# Patient Record
Sex: MALE | Race: WHITE | URBAN - METROPOLITAN AREA
[De-identification: names, ages, dates, MRNs, and addresses within clinical notes are randomized per-mention and may not be internally consistent; named-entity substitution may affect disease eponyms.]

---

## 2017-02-03 ENCOUNTER — HOSPITAL ENCOUNTER (OUTPATIENT)
Dept: MRI IMAGING | Age: 47
Discharge: HOME | End: 2017-02-03

## 2017-02-03 DIAGNOSIS — M51.36 ANNULAR TEAR OF LUMBAR DISC: ICD-10-CM

## 2023-09-21 ENCOUNTER — HOSPITAL ENCOUNTER (INPATIENT)
Age: 53
LOS: 2 days | Discharge: HOME | DRG: 445 | End: 2023-09-23
Attending: EMERGENCY MEDICINE | Admitting: STUDENT IN AN ORGANIZED HEALTH CARE EDUCATION/TRAINING PROGRAM
Payer: MEDICARE

## 2023-09-21 ENCOUNTER — APPOINTMENT (OUTPATIENT)
Dept: CT IMAGING | Age: 53
DRG: 445 | End: 2023-09-21
Payer: MEDICARE

## 2023-09-21 ENCOUNTER — APPOINTMENT (OUTPATIENT)
Dept: ULTRASOUND IMAGING | Age: 53
DRG: 445 | End: 2023-09-21
Payer: MEDICARE

## 2023-09-21 DIAGNOSIS — K80.51 CALCULUS OF BILE DUCT WITHOUT CHOLANGITIS WITH OBSTRUCTION: Primary | ICD-10-CM

## 2023-09-21 PROBLEM — K80.50 CHOLEDOCHOLITHIASIS: Status: ACTIVE | Noted: 2023-09-21

## 2023-09-21 LAB
ALBUMIN SERPL-MCNC: 3.7 G/DL (ref 3.4–5)
ALBUMIN/GLOB SERPL: 1.1 {RATIO} (ref 1.1–2.2)
ALP SERPL-CCNC: 77 U/L (ref 40–129)
ALT SERPL-CCNC: 27 U/L (ref 10–40)
ANION GAP SERPL CALCULATED.3IONS-SCNC: 14 MMOL/L (ref 3–16)
APTT BLD: 33.1 SEC (ref 22.7–35.9)
AST SERPL-CCNC: 24 U/L (ref 15–37)
BACTERIA URNS QL MICRO: ABNORMAL /HPF
BASOPHILS # BLD: 0.2 K/UL (ref 0–0.2)
BASOPHILS NFR BLD: 2 %
BILIRUB SERPL-MCNC: 5 MG/DL (ref 0–1)
BILIRUB UR QL STRIP.AUTO: ABNORMAL
BUN SERPL-MCNC: 18 MG/DL (ref 7–20)
CALCIUM SERPL-MCNC: 9.4 MG/DL (ref 8.3–10.6)
CHLORIDE SERPL-SCNC: 95 MMOL/L (ref 99–110)
CLARITY UR: CLEAR
CO2 SERPL-SCNC: 25 MMOL/L (ref 21–32)
COLOR UR: ABNORMAL
CREAT SERPL-MCNC: 0.9 MG/DL (ref 0.9–1.3)
DEPRECATED RDW RBC AUTO: 12.3 % (ref 12.4–15.4)
EOSINOPHIL # BLD: 0 K/UL (ref 0–0.6)
EOSINOPHIL NFR BLD: 0 %
EPI CELLS #/AREA URNS HPF: ABNORMAL /HPF (ref 0–5)
FLUAV RNA RESP QL NAA+PROBE: NOT DETECTED
FLUBV RNA RESP QL NAA+PROBE: NOT DETECTED
GFR SERPLBLD CREATININE-BSD FMLA CKD-EPI: >60 ML/MIN/{1.73_M2}
GLUCOSE SERPL-MCNC: 148 MG/DL (ref 70–99)
GLUCOSE UR STRIP.AUTO-MCNC: 100 MG/DL
HCT VFR BLD AUTO: 45.9 % (ref 40.5–52.5)
HGB BLD-MCNC: 16.9 G/DL (ref 13.5–17.5)
HGB UR QL STRIP.AUTO: NEGATIVE
HYALINE CASTS #/AREA URNS LPF: ABNORMAL /LPF (ref 0–2)
INR PPP: 1.26 (ref 0.84–1.16)
KETONES UR STRIP.AUTO-MCNC: 15 MG/DL
LACTATE BLDV-SCNC: 1.2 MMOL/L (ref 0.4–1.9)
LACTATE BLDV-SCNC: 1.4 MMOL/L (ref 0.4–1.9)
LEUKOCYTE ESTERASE UR QL STRIP.AUTO: NEGATIVE
LIPASE SERPL-CCNC: 31 U/L (ref 13–60)
LYMPHOCYTES # BLD: 0.8 K/UL (ref 1–5.1)
LYMPHOCYTES NFR BLD: 7 %
MAGNESIUM SERPL-MCNC: 1.8 MG/DL (ref 1.8–2.4)
MCH RBC QN AUTO: 30.4 PG (ref 26–34)
MCHC RBC AUTO-ENTMCNC: 36.8 G/DL (ref 31–36)
MCV RBC AUTO: 82.6 FL (ref 80–100)
MONOCYTES # BLD: 1.5 K/UL (ref 0–1.3)
MONOCYTES NFR BLD: 13 %
NEUTROPHILS # BLD: 9.2 K/UL (ref 1.7–7.7)
NEUTROPHILS NFR BLD: 68 %
NEUTS BAND NFR BLD MANUAL: 10 % (ref 0–7)
NEUTS VAC BLD QL SMEAR: PRESENT
NITRITE UR QL STRIP.AUTO: POSITIVE
PATH INTERP BLD-IMP: YES
PH UR STRIP.AUTO: 6.5 [PH] (ref 5–8)
PLATELET # BLD AUTO: 107 K/UL (ref 135–450)
PLATELET BLD QL SMEAR: ABNORMAL
PMV BLD AUTO: 8.2 FL (ref 5–10.5)
POTASSIUM SERPL-SCNC: 2.9 MMOL/L (ref 3.5–5.1)
PROT SERPL-MCNC: 7 G/DL (ref 6.4–8.2)
PROT UR STRIP.AUTO-MCNC: NEGATIVE MG/DL
PROTHROMBIN TIME: 15.8 SEC (ref 11.5–14.8)
RBC # BLD AUTO: 5.55 M/UL (ref 4.2–5.9)
RBC #/AREA URNS HPF: ABNORMAL /HPF (ref 0–4)
SARS-COV-2 RNA RESP QL NAA+PROBE: NOT DETECTED
SLIDE REVIEW: ABNORMAL
SODIUM SERPL-SCNC: 134 MMOL/L (ref 136–145)
SP GR UR STRIP.AUTO: 1.01 (ref 1–1.03)
UA COMPLETE W REFLEX CULTURE PNL UR: ABNORMAL
UA DIPSTICK W REFLEX MICRO PNL UR: YES
URN SPEC COLLECT METH UR: ABNORMAL
UROBILINOGEN UR STRIP-ACNC: >=8 E.U./DL
WBC # BLD AUTO: 11.8 K/UL (ref 4–11)
WBC #/AREA URNS HPF: ABNORMAL /HPF (ref 0–5)

## 2023-09-21 PROCEDURE — 1200000000 HC SEMI PRIVATE

## 2023-09-21 PROCEDURE — 6360000004 HC RX CONTRAST MEDICATION: Performed by: EMERGENCY MEDICINE

## 2023-09-21 PROCEDURE — 76705 ECHO EXAM OF ABDOMEN: CPT

## 2023-09-21 PROCEDURE — 6360000002 HC RX W HCPCS: Performed by: EMERGENCY MEDICINE

## 2023-09-21 PROCEDURE — 87636 SARSCOV2 & INF A&B AMP PRB: CPT

## 2023-09-21 PROCEDURE — 96366 THER/PROPH/DIAG IV INF ADDON: CPT

## 2023-09-21 PROCEDURE — G0378 HOSPITAL OBSERVATION PER HR: HCPCS

## 2023-09-21 PROCEDURE — 2580000003 HC RX 258: Performed by: EMERGENCY MEDICINE

## 2023-09-21 PROCEDURE — 83690 ASSAY OF LIPASE: CPT

## 2023-09-21 PROCEDURE — 85610 PROTHROMBIN TIME: CPT

## 2023-09-21 PROCEDURE — 2580000003 HC RX 258: Performed by: STUDENT IN AN ORGANIZED HEALTH CARE EDUCATION/TRAINING PROGRAM

## 2023-09-21 PROCEDURE — 6360000002 HC RX W HCPCS: Performed by: STUDENT IN AN ORGANIZED HEALTH CARE EDUCATION/TRAINING PROGRAM

## 2023-09-21 PROCEDURE — 80053 COMPREHEN METABOLIC PANEL: CPT

## 2023-09-21 PROCEDURE — 85025 COMPLETE CBC W/AUTO DIFF WBC: CPT

## 2023-09-21 PROCEDURE — 82746 ASSAY OF FOLIC ACID SERUM: CPT

## 2023-09-21 PROCEDURE — 83605 ASSAY OF LACTIC ACID: CPT

## 2023-09-21 PROCEDURE — 36415 COLL VENOUS BLD VENIPUNCTURE: CPT

## 2023-09-21 PROCEDURE — 96365 THER/PROPH/DIAG IV INF INIT: CPT

## 2023-09-21 PROCEDURE — C9113 INJ PANTOPRAZOLE SODIUM, VIA: HCPCS | Performed by: STUDENT IN AN ORGANIZED HEALTH CARE EDUCATION/TRAINING PROGRAM

## 2023-09-21 PROCEDURE — 82607 VITAMIN B-12: CPT

## 2023-09-21 PROCEDURE — 85730 THROMBOPLASTIN TIME PARTIAL: CPT

## 2023-09-21 PROCEDURE — 87040 BLOOD CULTURE FOR BACTERIA: CPT

## 2023-09-21 PROCEDURE — 81001 URINALYSIS AUTO W/SCOPE: CPT

## 2023-09-21 PROCEDURE — 99285 EMERGENCY DEPT VISIT HI MDM: CPT

## 2023-09-21 PROCEDURE — 96375 TX/PRO/DX INJ NEW DRUG ADDON: CPT

## 2023-09-21 PROCEDURE — 74177 CT ABD & PELVIS W/CONTRAST: CPT

## 2023-09-21 PROCEDURE — 83735 ASSAY OF MAGNESIUM: CPT

## 2023-09-21 PROCEDURE — 96361 HYDRATE IV INFUSION ADD-ON: CPT

## 2023-09-21 RX ORDER — METRONIDAZOLE 500 MG/100ML
500 INJECTION, SOLUTION INTRAVENOUS EVERY 8 HOURS
Status: DISCONTINUED | OUTPATIENT
Start: 2023-09-22 | End: 2023-09-23 | Stop reason: HOSPADM

## 2023-09-21 RX ORDER — CIPROFLOXACIN 2 MG/ML
400 INJECTION, SOLUTION INTRAVENOUS ONCE
Status: COMPLETED | OUTPATIENT
Start: 2023-09-21 | End: 2023-09-21

## 2023-09-21 RX ORDER — METRONIDAZOLE 500 MG/100ML
500 INJECTION, SOLUTION INTRAVENOUS ONCE
Status: COMPLETED | OUTPATIENT
Start: 2023-09-21 | End: 2023-09-21

## 2023-09-21 RX ORDER — SPIRONOLACTONE 25 MG/1
25 TABLET ORAL DAILY
COMMUNITY

## 2023-09-21 RX ORDER — ONDANSETRON 2 MG/ML
4 INJECTION INTRAMUSCULAR; INTRAVENOUS ONCE
Status: COMPLETED | OUTPATIENT
Start: 2023-09-21 | End: 2023-09-21

## 2023-09-21 RX ORDER — POTASSIUM CHLORIDE 7.45 MG/ML
10 INJECTION INTRAVENOUS ONCE
Status: COMPLETED | OUTPATIENT
Start: 2023-09-21 | End: 2023-09-21

## 2023-09-21 RX ORDER — POLYETHYLENE GLYCOL 3350 17 G/17G
17 POWDER, FOR SOLUTION ORAL DAILY PRN
Status: DISCONTINUED | OUTPATIENT
Start: 2023-09-21 | End: 2023-09-23 | Stop reason: HOSPADM

## 2023-09-21 RX ORDER — GABAPENTIN 300 MG/1
300 CAPSULE ORAL 2 TIMES DAILY
COMMUNITY

## 2023-09-21 RX ORDER — ENOXAPARIN SODIUM 100 MG/ML
40 INJECTION SUBCUTANEOUS DAILY
Status: DISCONTINUED | OUTPATIENT
Start: 2023-09-22 | End: 2023-09-23 | Stop reason: HOSPADM

## 2023-09-21 RX ORDER — LACTULOSE 10 G/15ML
30 SOLUTION ORAL DAILY
COMMUNITY

## 2023-09-21 RX ORDER — ONDANSETRON 2 MG/ML
4 INJECTION INTRAMUSCULAR; INTRAVENOUS EVERY 6 HOURS PRN
Status: DISCONTINUED | OUTPATIENT
Start: 2023-09-21 | End: 2023-09-23 | Stop reason: HOSPADM

## 2023-09-21 RX ORDER — SODIUM CHLORIDE 9 MG/ML
INJECTION, SOLUTION INTRAVENOUS PRN
Status: DISCONTINUED | OUTPATIENT
Start: 2023-09-21 | End: 2023-09-23 | Stop reason: HOSPADM

## 2023-09-21 RX ORDER — ACETAMINOPHEN 650 MG/1
650 SUPPOSITORY RECTAL EVERY 6 HOURS PRN
Status: DISCONTINUED | OUTPATIENT
Start: 2023-09-21 | End: 2023-09-23 | Stop reason: HOSPADM

## 2023-09-21 RX ORDER — SODIUM CHLORIDE 0.9 % (FLUSH) 0.9 %
5-40 SYRINGE (ML) INJECTION PRN
Status: DISCONTINUED | OUTPATIENT
Start: 2023-09-21 | End: 2023-09-23 | Stop reason: HOSPADM

## 2023-09-21 RX ORDER — ONDANSETRON 4 MG/1
4 TABLET, ORALLY DISINTEGRATING ORAL EVERY 8 HOURS PRN
Status: DISCONTINUED | OUTPATIENT
Start: 2023-09-21 | End: 2023-09-23 | Stop reason: HOSPADM

## 2023-09-21 RX ORDER — ACETAMINOPHEN 325 MG/1
650 TABLET ORAL EVERY 6 HOURS PRN
Status: DISCONTINUED | OUTPATIENT
Start: 2023-09-21 | End: 2023-09-23 | Stop reason: HOSPADM

## 2023-09-21 RX ORDER — 0.9 % SODIUM CHLORIDE 0.9 %
1000 INTRAVENOUS SOLUTION INTRAVENOUS ONCE
Status: COMPLETED | OUTPATIENT
Start: 2023-09-21 | End: 2023-09-21

## 2023-09-21 RX ORDER — OMEPRAZOLE 20 MG/1
20 CAPSULE, DELAYED RELEASE ORAL DAILY
COMMUNITY

## 2023-09-21 RX ORDER — PANTOPRAZOLE SODIUM 40 MG/10ML
40 INJECTION, POWDER, LYOPHILIZED, FOR SOLUTION INTRAVENOUS DAILY
Status: DISCONTINUED | OUTPATIENT
Start: 2023-09-21 | End: 2023-09-23 | Stop reason: HOSPADM

## 2023-09-21 RX ORDER — SODIUM CHLORIDE 0.9 % (FLUSH) 0.9 %
5-40 SYRINGE (ML) INJECTION EVERY 12 HOURS SCHEDULED
Status: DISCONTINUED | OUTPATIENT
Start: 2023-09-21 | End: 2023-09-23 | Stop reason: HOSPADM

## 2023-09-21 RX ORDER — SODIUM CHLORIDE, SODIUM LACTATE, POTASSIUM CHLORIDE, CALCIUM CHLORIDE 600; 310; 30; 20 MG/100ML; MG/100ML; MG/100ML; MG/100ML
INJECTION, SOLUTION INTRAVENOUS CONTINUOUS
Status: DISCONTINUED | OUTPATIENT
Start: 2023-09-21 | End: 2023-09-23 | Stop reason: HOSPADM

## 2023-09-21 RX ORDER — PROPRANOLOL HYDROCHLORIDE 20 MG/1
20 TABLET ORAL 2 TIMES DAILY
COMMUNITY

## 2023-09-21 RX ADMIN — ONDANSETRON 4 MG: 2 INJECTION INTRAMUSCULAR; INTRAVENOUS at 13:54

## 2023-09-21 RX ADMIN — PANTOPRAZOLE SODIUM 40 MG: 40 INJECTION, POWDER, FOR SOLUTION INTRAVENOUS at 20:41

## 2023-09-21 RX ADMIN — METRONIDAZOLE 500 MG: 500 INJECTION, SOLUTION INTRAVENOUS at 16:09

## 2023-09-21 RX ADMIN — SODIUM CHLORIDE 1000 ML: 9 INJECTION, SOLUTION INTRAVENOUS at 13:54

## 2023-09-21 RX ADMIN — METRONIDAZOLE 500 MG: 500 INJECTION, SOLUTION INTRAVENOUS at 21:16

## 2023-09-21 RX ADMIN — POTASSIUM CHLORIDE 10 MEQ: 7.46 INJECTION, SOLUTION INTRAVENOUS at 14:33

## 2023-09-21 RX ADMIN — IOPAMIDOL 75 ML: 755 INJECTION, SOLUTION INTRAVENOUS at 14:49

## 2023-09-21 RX ADMIN — SODIUM CHLORIDE, POTASSIUM CHLORIDE, SODIUM LACTATE AND CALCIUM CHLORIDE: 600; 310; 30; 20 INJECTION, SOLUTION INTRAVENOUS at 23:19

## 2023-09-21 RX ADMIN — CEFTRIAXONE SODIUM 2000 MG: 2 INJECTION, POWDER, FOR SOLUTION INTRAMUSCULAR; INTRAVENOUS at 20:41

## 2023-09-21 RX ADMIN — CIPROFLOXACIN 400 MG: 400 INJECTION, SOLUTION INTRAVENOUS at 17:41

## 2023-09-21 ASSESSMENT — ENCOUNTER SYMPTOMS
ABDOMINAL PAIN: 1
CHEST TIGHTNESS: 0
COLOR CHANGE: 1
COUGH: 1
SORE THROAT: 0
VOMITING: 0
NAUSEA: 1
SHORTNESS OF BREATH: 0
EYES NEGATIVE: 1
DIARRHEA: 1

## 2023-09-21 ASSESSMENT — PAIN - FUNCTIONAL ASSESSMENT: PAIN_FUNCTIONAL_ASSESSMENT: 0-10

## 2023-09-21 ASSESSMENT — PAIN SCALES - GENERAL: PAINLEVEL_OUTOF10: 2

## 2023-09-21 NOTE — ED NOTES
@2202 called general surgery per Dr. Alex Delong  RE:Cholelithiasis with obstruction  @9051 second page  @6677 Dr. Ermelinda Jamison called [facility] and spoke to Dr. Thu Hartmann  09/21/23 [phone]

## 2023-09-21 NOTE — ED NOTES
@4866 perfectserved Dr. Dayana Nixon per Dr. Natalya Cabrales also called [facility]   RE: Cholelithiasis  Dr. Vincent Reed called back, Dr. Natalya Cabrales was in a sterile procedure.  Dr. Vincent Reed left personal phone number  Emiliano Beth called Dr. Dennis Gardner  09/21/23 [phone]

## 2023-09-21 NOTE — ED PROVIDER NOTES
[address] [place] [address]  ED  EMERGENCY DEPARTMENT ENCOUNTER        Pt Name: Kam Lopez  MRN: [id number]  [facility] 1970  Date of evaluation: 9/21/2023  Provider: Rebecca Neves MD  PCP: Ramya Hdz MD  Note Started: 2:15 PM EDT 9/21/23    CHIEF COMPLAINT       Chief Complaint   Patient presents with    Illness     Pt reports abd cramps, \"shakes\", \"sweats\", muscle pain, nausea and diarrhea since Sunday. Pt self dx with the flu but sts that he has hx of cirrhosis and feels as if his skin is yellowing and is concerned with that. Pt reports dark urine as well. HISTORY OF PRESENT ILLNESS: 1 or more Elements     History from : Patient    Limitations to history : None    Kam Lopez is a 47 y.o. male who presents with generalized illness, abdominal cramps, sweats, chills, shakes, myalgias, nausea and diarrhea. Patient states he felt like he had the flu last week improved from that but now he feels like his skin is yellow. He is also had very dark urine. Having intermittent right upper quadrant abdominal pain. Patient has a history of alcohol induced cirrhosis but states he has not drank for greater than 10 years. Nursing Notes were all reviewed and agreed with or any disagreements were addressed in the HPI. REVIEW OF SYSTEMS :      Review of Systems   Constitutional:  Positive for chills, fatigue and fever. HENT:  Positive for congestion. Negative for sore throat. Eyes: Negative. Respiratory:  Positive for cough. Negative for chest tightness and shortness of breath. Cardiovascular:  Negative for chest pain. Gastrointestinal:  Positive for abdominal pain, diarrhea and nausea. Negative for vomiting. Genitourinary: Negative. Musculoskeletal:  Positive for myalgias. Skin:  Positive for color change. Neurological:  Negative for dizziness, light-headedness and headaches. All other systems reviewed and are negative.       Positives and Pertinent negatives as per

## 2023-09-21 NOTE — ED NOTES
Mr. Luz Calderón is a 47 y.o. male who had concerns including Illness (Pt reports abd cramps, \"shakes\", \"sweats\", muscle pain, nausea and diarrhea since Sunday. Pt self dx with the flu but sts that he has hx of cirrhosis and feels as if his skin is yellowing and is concerned with that. Pt reports dark urine as well. ). Chief Complaint   Patient presents with    Illness     Pt reports abd cramps, \"shakes\", \"sweats\", muscle pain, nausea and diarrhea since Sunday. Pt self dx with the flu but sts that he has hx of cirrhosis and feels as if his skin is yellowing and is concerned with that. Pt reports dark urine as well. He is being admitted for:    Choledocholithiasis    His ED problem list included:    1. Calculus of bile duct without cholangitis with obstruction        Past Medical History:   Diagnosis Date    Chronic back pain     Liver cirrhosis ([address])        History reviewed. No pertinent surgical history.     His recent abnormal labs were:    Labs Reviewed   CBC WITH AUTO DIFFERENTIAL - Abnormal; Notable for the following components:       Result Value    WBC 11.8 (*)     MCHC 36.8 (*)     RDW 12.3 (*)     Platelets 391 (*)     Neutrophils Absolute 9.2 (*)     Lymphocytes Absolute 0.8 (*)     Monocytes Absolute 1.5 (*)     Bands Relative 10 (*)     Vacuolated Neutrophils Present (*)     All other components within normal limits   COMPREHENSIVE METABOLIC PANEL W/ REFLEX TO MG FOR LOW K - Abnormal; Notable for the following components:    Sodium 134 (*)     Potassium reflex Magnesium 2.9 (*)     Chloride 95 (*)     Glucose 148 (*)     Total Bilirubin 5.0 (*)     All other components within normal limits    Narrative:     CALL  Arthur  [facility] tel. [phone],  Chemistry results called to and read back by Guido Underwood RN, 09/21/2023  14:23, by [address] - Abnormal; Notable for the following components:    Protime 15.8 (*)     INR 1.26 (*)     All other components within normal limits   COVID-19 & INFLUENZA elevated bilirubin, hx of cirrhosis TECHNOLOGIST PROVIDED HISTORY: Reason for exam:->Jaundice, RUQ, elevated bilirubin, hx of cirrhosis FINDINGS: LIVER:  There is a lobular contour to the liver. No biliary ductal dilatation or mass BILIARY SYSTEM:  Stones are seen in the gallbladder. No pericholecystic fluid. No sonographic Wilkins sign Common bile duct is within normal limits measuring 5 mm. RIGHT KIDNEY: The right kidney is grossly unremarkable without evidence of hydronephrosis. PANCREAS:  Visualized portions of the pancreas are unremarkable. OTHER: No evidence of right upper quadrant ascites. Cholelithiasis. No cholecystitis Lobular contour to the liver, compatible with cirrhosis. No biliary ductal dilatation or mass     CT ABDOMEN PELVIS W IV CONTRAST Additional Contrast? None    Result Date: 9/21/2023  EXAMINATION: CT OF THE ABDOMEN AND PELVIS WITH CONTRAST 9/21/2023 2:39 pm TECHNIQUE: CT of the abdomen and pelvis was performed with the administration of intravenous contrast. Multiplanar reformatted images are provided for review. Automated exposure control, iterative reconstruction, and/or weight based adjustment of the mA/kV was utilized to reduce the radiation dose to as low as reasonably achievable. COMPARISON: None. HISTORY: ORDERING SYSTEM PROVIDED HISTORY: RUQ pain, jaundice, hx of cirrhosis TECHNOLOGIST PROVIDED HISTORY: Additional Contrast?->None Reason for exam:->RUQ pain, jaundice, hx of cirrhosis Decision Support Exception - unselect if not a suspected or confirmed emergency medical condition->Emergency Medical Condition (MA) Reason for Exam: RUQ pain since sunday-nausea-chills-diarrhea Additional signs and symptoms: hx-cirrhosis Relevant Medical/Surgical History: no surg FINDINGS: Lower Chest: No acute infiltrate or pleural effusion. Mild right basilar atelectasis. Organs: There are numerous gallstones within the gallbladder lumen. The gallbladder wall is thickened up to 4 mm.   No

## 2023-09-21 NOTE — H&P
V2.0  History and Physical      Name:  Constantin Katz /Age/Sex: 1970  (47 y.o. male)   MRN & CSN:  [id number] & [id number] Encounter Date/Time: 2023 4:55 PM EDT   Location:   PCP: Octavio Gunter MD       Hospital Day: 1    Assessment and Plan:   Constantin Katz is a 47 y.o. male with a pmh of alcoholic cirrhosis who presents with Choledocholithiasis    Hospital Problems             Last Modified POA    * (Principal) Choledocholithiasis 2023 Yes       Right upper quadrant pain in the setting of choledocholithiasis with obstruction: GI has been consulted. General surgery Case was discussed with them. Plan is ERCP likely in the morning. N.p.o. at midnight. Start the patient on ceftriaxone and Flagyl. IV fluid hydration. Continue to monitor. We will start the patient on ceftriaxone and Flagyl. Generalized weakness and debility in the setting of above  Underlying history of of alcoholic cirrhosis complicated with esophageal varices with history of banding: poorly compliant with medications. CT scan of the abdominal pelvis shows concerns for cholecystitis? Piper Nichols Ultrasound of the right upper quadrant shows choledocholithiasis with gallstones. GI to be consulted. Trend bilirubin levels. Consider starting lactulose and rifaximin  Hypokalemia replete as needed    Discussed with ED doctor regarding need for GI and general surgery to discuss the case for consideration for ERCP versus cholecystectomy. Comment: Please note this report has been produced using speech recognition software and may contain errors related to that system including errors in grammar, punctuation, and spelling, as well as words and phrases that may be inappropriate. If there are any questions or concerns please feel free to contact the dictating provider for clarification.      Disposition:   Current Living situation: Home  Expected Disposition: Home  Estimated D/C: 2 to 3 days    Diet No diet orders on file   DVT reformatted images are provided for review. Automated exposure control, iterative reconstruction, and/or weight based adjustment of the mA/kV was utilized to reduce the radiation dose to as low as reasonably achievable. COMPARISON: None. HISTORY: ORDERING SYSTEM PROVIDED HISTORY: RUQ pain, jaundice, hx of cirrhosis TECHNOLOGIST PROVIDED HISTORY: Additional Contrast?->None Reason for exam:->RUQ pain, jaundice, hx of cirrhosis Decision Support Exception - unselect if not a suspected or confirmed emergency medical condition->Emergency Medical Condition (MA) Reason for Exam: RUQ pain since sunday-nausea-chills-diarrhea Additional signs and symptoms: hx-cirrhosis Relevant Medical/Surgical History: no surg FINDINGS: Lower Chest: No acute infiltrate or pleural effusion. Mild right basilar atelectasis. Organs: There are numerous gallstones within the gallbladder lumen. The gallbladder wall is thickened up to 4 mm. No significant surrounding pericholecystic inflammatory change. No free fluid around the gallbladder. There are 3 stones within the distal common bile duct near the ampulla measuring up to 3 mm. The common bile duct is at the upper limits of normal measuring up to 6 mm. Diffusely nodular surface contour of the liver consistent with cirrhosis. No focal liver lesions or intrahepatic biliary ductal dilatation. The portal vein is patent. There is recanalization of the umbilical vein. The spleen and pancreas are normal.  The adrenal glands are normal.  3 mm nonobstructing left renal stone. The right kidney is unremarkable GI/Bowel: The distal esophagus and stomach are normal.  Small bowel loops show no evidence of obstruction or inflammation. The colon shows no evidence of wall thickening or inflammatory change. Normal appendix. Pelvis: The urinary bladder and prostate are normal.  No free fluid. Peritoneum/Retroperitoneum: The aorta is normal caliber. No lymphadenopathy.  Bones/Soft Tissues: No suspicious osseous lesion. 1.  Numerous gallstones within the gallbladder lumen. The gallbladder wall is thickened up to 4 mm. This may represent acute cholecystitis. Recommend further evaluation with right upper quadrant ultrasound. 2.  3 small stones are seen within the distal common bile duct near the ampulla measuring up to 3 mm. The common bile duct is at the upper limits of normal measuring 6 mm. This may represent developing common bile duct obstruction. 3.  Findings of hepatic cirrhosis with sequela of portal hypertension.        Personally reviewed Lab Studies, Imaging    Electronically signed by Best Zhagn MD on 9/21/2023 at 5:02 PM

## 2023-09-22 ENCOUNTER — ANESTHESIA EVENT (OUTPATIENT)
Dept: ENDOSCOPY | Age: 53
End: 2023-09-22
Payer: MEDICARE

## 2023-09-22 ENCOUNTER — APPOINTMENT (OUTPATIENT)
Dept: GENERAL RADIOLOGY | Age: 53
DRG: 445 | End: 2023-09-22
Payer: MEDICARE

## 2023-09-22 ENCOUNTER — ANESTHESIA (OUTPATIENT)
Dept: ENDOSCOPY | Age: 53
End: 2023-09-22
Payer: MEDICARE

## 2023-09-22 PROBLEM — K80.51 CALCULUS OF BILE DUCT WITHOUT CHOLANGITIS WITH OBSTRUCTION: Status: ACTIVE | Noted: 2023-09-22

## 2023-09-22 LAB
ALBUMIN SERPL-MCNC: 2.7 G/DL (ref 3.4–5)
ALP SERPL-CCNC: 54 U/L (ref 40–129)
ALT SERPL-CCNC: 18 U/L (ref 10–40)
ANION GAP SERPL CALCULATED.3IONS-SCNC: 8 MMOL/L (ref 3–16)
AST SERPL-CCNC: 16 U/L (ref 15–37)
BACTERIA BLD CULT ORG #2: NORMAL
BACTERIA BLD CULT: NORMAL
BACTERIA URNS QL MICRO: ABNORMAL /HPF
BILIRUB DIRECT SERPL-MCNC: 1.3 MG/DL (ref 0–0.3)
BILIRUB INDIRECT SERPL-MCNC: 1.4 MG/DL (ref 0–1)
BILIRUB SERPL-MCNC: 2.7 MG/DL (ref 0–1)
BILIRUB UR QL STRIP.AUTO: ABNORMAL
BUN SERPL-MCNC: 14 MG/DL (ref 7–20)
CALCIUM SERPL-MCNC: 8.2 MG/DL (ref 8.3–10.6)
CHLORIDE SERPL-SCNC: 104 MMOL/L (ref 99–110)
CLARITY UR: CLEAR
CO2 SERPL-SCNC: 25 MMOL/L (ref 21–32)
COLOR UR: ABNORMAL
CREAT SERPL-MCNC: 0.7 MG/DL (ref 0.9–1.3)
DEPRECATED RDW RBC AUTO: 12.5 % (ref 12.4–15.4)
EPI CELLS #/AREA URNS HPF: ABNORMAL /HPF (ref 0–5)
FOLATE SERPL-MCNC: 8.31 NG/ML (ref 4.78–24.2)
GFR SERPLBLD CREATININE-BSD FMLA CKD-EPI: >60 ML/MIN/{1.73_M2}
GLUCOSE SERPL-MCNC: 103 MG/DL (ref 70–99)
GLUCOSE UR STRIP.AUTO-MCNC: 100 MG/DL
HCT VFR BLD AUTO: 38 % (ref 40.5–52.5)
HGB BLD-MCNC: 14 G/DL (ref 13.5–17.5)
HGB UR QL STRIP.AUTO: NEGATIVE
INR PPP: 1.25 (ref 0.84–1.16)
KETONES UR STRIP.AUTO-MCNC: 40 MG/DL
LEUKOCYTE ESTERASE UR QL STRIP.AUTO: ABNORMAL
MAGNESIUM SERPL-MCNC: 1.9 MG/DL (ref 1.8–2.4)
MCH RBC QN AUTO: 30.5 PG (ref 26–34)
MCHC RBC AUTO-ENTMCNC: 36.9 G/DL (ref 31–36)
MCV RBC AUTO: 82.5 FL (ref 80–100)
MUCOUS THREADS #/AREA URNS LPF: ABNORMAL /LPF
NITRITE UR QL STRIP.AUTO: NEGATIVE
PATH INTERP BLD-IMP: NORMAL
PH UR STRIP.AUTO: 7.5 [PH] (ref 5–8)
PHOSPHATE SERPL-MCNC: 2.1 MG/DL (ref 2.5–4.9)
PLATELET # BLD AUTO: 73 K/UL (ref 135–450)
PMV BLD AUTO: 8 FL (ref 5–10.5)
POTASSIUM SERPL-SCNC: 3 MMOL/L (ref 3.5–5.1)
POTASSIUM SERPL-SCNC: 4 MMOL/L (ref 3.5–5.1)
PROT SERPL-MCNC: 5.5 G/DL (ref 6.4–8.2)
PROT UR STRIP.AUTO-MCNC: ABNORMAL MG/DL
PROTHROMBIN TIME: 15.7 SEC (ref 11.5–14.8)
RBC # BLD AUTO: 4.61 M/UL (ref 4.2–5.9)
RBC #/AREA URNS HPF: ABNORMAL /HPF (ref 0–4)
SODIUM SERPL-SCNC: 137 MMOL/L (ref 136–145)
SP GR UR STRIP.AUTO: 1.01 (ref 1–1.03)
UA DIPSTICK W REFLEX MICRO PNL UR: YES
URN SPEC COLLECT METH UR: ABNORMAL
UROBILINOGEN UR STRIP-ACNC: >=8 E.U./DL
VIT B12 SERPL-MCNC: 566 PG/ML (ref 211–911)
WBC # BLD AUTO: 5.7 K/UL (ref 4–11)
WBC #/AREA URNS HPF: ABNORMAL /HPF (ref 0–5)

## 2023-09-22 PROCEDURE — 2580000003 HC RX 258: Performed by: INTERNAL MEDICINE

## 2023-09-22 PROCEDURE — 3700000001 HC ADD 15 MINUTES (ANESTHESIA): Performed by: INTERNAL MEDICINE

## 2023-09-22 PROCEDURE — 6360000002 HC RX W HCPCS: Performed by: INTERNAL MEDICINE

## 2023-09-22 PROCEDURE — 36415 COLL VENOUS BLD VENIPUNCTURE: CPT

## 2023-09-22 PROCEDURE — 97535 SELF CARE MNGMENT TRAINING: CPT

## 2023-09-22 PROCEDURE — C2625 STENT, NON-COR, TEM W/DEL SY: HCPCS | Performed by: INTERNAL MEDICINE

## 2023-09-22 PROCEDURE — 83735 ASSAY OF MAGNESIUM: CPT

## 2023-09-22 PROCEDURE — 97161 PT EVAL LOW COMPLEX 20 MIN: CPT

## 2023-09-22 PROCEDURE — 2580000003 HC RX 258: Performed by: STUDENT IN AN ORGANIZED HEALTH CARE EDUCATION/TRAINING PROGRAM

## 2023-09-22 PROCEDURE — 6360000002 HC RX W HCPCS: Performed by: STUDENT IN AN ORGANIZED HEALTH CARE EDUCATION/TRAINING PROGRAM

## 2023-09-22 PROCEDURE — 6370000000 HC RX 637 (ALT 250 FOR IP): Performed by: INTERNAL MEDICINE

## 2023-09-22 PROCEDURE — 3609015200 HC ERCP REMOVE CALCULI/DEBRIS BILIARY/PANCREAS DUCT: Performed by: INTERNAL MEDICINE

## 2023-09-22 PROCEDURE — 3609018800 HC ERCP DX COLLECTION SPECIMEN BRUSHING/WASHING: Performed by: INTERNAL MEDICINE

## 2023-09-22 PROCEDURE — 84132 ASSAY OF SERUM POTASSIUM: CPT

## 2023-09-22 PROCEDURE — C9113 INJ PANTOPRAZOLE SODIUM, VIA: HCPCS | Performed by: STUDENT IN AN ORGANIZED HEALTH CARE EDUCATION/TRAINING PROGRAM

## 2023-09-22 PROCEDURE — 97116 GAIT TRAINING THERAPY: CPT

## 2023-09-22 PROCEDURE — 1200000000 HC SEMI PRIVATE

## 2023-09-22 PROCEDURE — 0F798DZ DILATION OF COMMON BILE DUCT WITH INTRALUMINAL DEVICE, VIA NATURAL OR ARTIFICIAL OPENING ENDOSCOPIC: ICD-10-PCS | Performed by: INTERNAL MEDICINE

## 2023-09-22 PROCEDURE — 99222 1ST HOSP IP/OBS MODERATE 55: CPT | Performed by: SURGERY

## 2023-09-22 PROCEDURE — 6370000000 HC RX 637 (ALT 250 FOR IP): Performed by: REGISTERED NURSE

## 2023-09-22 PROCEDURE — 3609014900 HC ERCP W/SPHINCTEROTOMY &/OR PAPILLOTOMY: Performed by: INTERNAL MEDICINE

## 2023-09-22 PROCEDURE — 2720000010 HC SURG SUPPLY STERILE: Performed by: INTERNAL MEDICINE

## 2023-09-22 PROCEDURE — 3609015100 HC ERCP STENT PLACEMENT BILIARY/PANCREATIC DUCT: Performed by: INTERNAL MEDICINE

## 2023-09-22 PROCEDURE — 80048 BASIC METABOLIC PNL TOTAL CA: CPT

## 2023-09-22 PROCEDURE — 85027 COMPLETE CBC AUTOMATED: CPT

## 2023-09-22 PROCEDURE — 74328 X-RAY BILE DUCT ENDOSCOPY: CPT

## 2023-09-22 PROCEDURE — G0378 HOSPITAL OBSERVATION PER HR: HCPCS

## 2023-09-22 PROCEDURE — 6360000002 HC RX W HCPCS: Performed by: NURSE ANESTHETIST, CERTIFIED REGISTERED

## 2023-09-22 PROCEDURE — 0FC98ZZ EXTIRPATION OF MATTER FROM COMMON BILE DUCT, VIA NATURAL OR ARTIFICIAL OPENING ENDOSCOPIC: ICD-10-PCS | Performed by: INTERNAL MEDICINE

## 2023-09-22 PROCEDURE — 84100 ASSAY OF PHOSPHORUS: CPT

## 2023-09-22 PROCEDURE — 80076 HEPATIC FUNCTION PANEL: CPT

## 2023-09-22 PROCEDURE — 3700000000 HC ANESTHESIA ATTENDED CARE: Performed by: INTERNAL MEDICINE

## 2023-09-22 PROCEDURE — 2500000003 HC RX 250 WO HCPCS: Performed by: STUDENT IN AN ORGANIZED HEALTH CARE EDUCATION/TRAINING PROGRAM

## 2023-09-22 PROCEDURE — 85610 PROTHROMBIN TIME: CPT

## 2023-09-22 PROCEDURE — 81001 URINALYSIS AUTO W/SCOPE: CPT

## 2023-09-22 PROCEDURE — 2709999900 HC NON-CHARGEABLE SUPPLY: Performed by: INTERNAL MEDICINE

## 2023-09-22 PROCEDURE — 7100000000 HC PACU RECOVERY - FIRST 15 MIN: Performed by: INTERNAL MEDICINE

## 2023-09-22 PROCEDURE — 2500000003 HC RX 250 WO HCPCS: Performed by: NURSE ANESTHETIST, CERTIFIED REGISTERED

## 2023-09-22 PROCEDURE — 3609017100 HC EGD: Performed by: INTERNAL MEDICINE

## 2023-09-22 PROCEDURE — 2580000003 HC RX 258: Performed by: NURSE ANESTHETIST, CERTIFIED REGISTERED

## 2023-09-22 PROCEDURE — 7100000001 HC PACU RECOVERY - ADDTL 15 MIN: Performed by: INTERNAL MEDICINE

## 2023-09-22 PROCEDURE — 6370000000 HC RX 637 (ALT 250 FOR IP): Performed by: NURSE PRACTITIONER

## 2023-09-22 PROCEDURE — 97165 OT EVAL LOW COMPLEX 30 MIN: CPT

## 2023-09-22 DEVICE — BILIARY STENT WITH NAVIFLEXTM RX DELIVERY SYSTEM
Type: IMPLANTABLE DEVICE | Site: BILE DUCT | Status: FUNCTIONAL
Brand: ADVANIX™ BILIARY

## 2023-09-22 RX ORDER — ROCURONIUM BROMIDE 10 MG/ML
INJECTION, SOLUTION INTRAVENOUS PRN
Status: DISCONTINUED | OUTPATIENT
Start: 2023-09-22 | End: 2023-09-22 | Stop reason: SDUPTHER

## 2023-09-22 RX ORDER — ONDANSETRON 2 MG/ML
INJECTION INTRAMUSCULAR; INTRAVENOUS PRN
Status: DISCONTINUED | OUTPATIENT
Start: 2023-09-22 | End: 2023-09-22 | Stop reason: SDUPTHER

## 2023-09-22 RX ORDER — SODIUM CHLORIDE, SODIUM LACTATE, POTASSIUM CHLORIDE, CALCIUM CHLORIDE 600; 310; 30; 20 MG/100ML; MG/100ML; MG/100ML; MG/100ML
INJECTION, SOLUTION INTRAVENOUS CONTINUOUS PRN
Status: DISCONTINUED | OUTPATIENT
Start: 2023-09-22 | End: 2023-09-22 | Stop reason: SDUPTHER

## 2023-09-22 RX ORDER — OXYCODONE HYDROCHLORIDE 5 MG/1
10 TABLET ORAL PRN
Status: DISCONTINUED | OUTPATIENT
Start: 2023-09-22 | End: 2023-09-22

## 2023-09-22 RX ORDER — MECOBALAMIN 5000 MCG
10 TABLET,DISINTEGRATING ORAL NIGHTLY
Status: DISCONTINUED | OUTPATIENT
Start: 2023-09-22 | End: 2023-09-23 | Stop reason: HOSPADM

## 2023-09-22 RX ORDER — GABAPENTIN 300 MG/1
300 CAPSULE ORAL DAILY
Status: DISCONTINUED | OUTPATIENT
Start: 2023-09-22 | End: 2023-09-23 | Stop reason: HOSPADM

## 2023-09-22 RX ORDER — LABETALOL HYDROCHLORIDE 5 MG/ML
INJECTION, SOLUTION INTRAVENOUS PRN
Status: DISCONTINUED | OUTPATIENT
Start: 2023-09-22 | End: 2023-09-22 | Stop reason: SDUPTHER

## 2023-09-22 RX ORDER — HYDROMORPHONE HYDROCHLORIDE 1 MG/ML
0.25 INJECTION, SOLUTION INTRAMUSCULAR; INTRAVENOUS; SUBCUTANEOUS EVERY 5 MIN PRN
Status: DISCONTINUED | OUTPATIENT
Start: 2023-09-22 | End: 2023-09-22

## 2023-09-22 RX ORDER — DEXAMETHASONE SODIUM PHOSPHATE 4 MG/ML
INJECTION, SOLUTION INTRA-ARTICULAR; INTRALESIONAL; INTRAMUSCULAR; INTRAVENOUS; SOFT TISSUE PRN
Status: DISCONTINUED | OUTPATIENT
Start: 2023-09-22 | End: 2023-09-22 | Stop reason: SDUPTHER

## 2023-09-22 RX ORDER — HYDROMORPHONE HYDROCHLORIDE 1 MG/ML
0.5 INJECTION, SOLUTION INTRAMUSCULAR; INTRAVENOUS; SUBCUTANEOUS EVERY 5 MIN PRN
Status: DISCONTINUED | OUTPATIENT
Start: 2023-09-22 | End: 2023-09-22

## 2023-09-22 RX ORDER — LIDOCAINE HYDROCHLORIDE 20 MG/ML
INJECTION, SOLUTION INFILTRATION; PERINEURAL PRN
Status: DISCONTINUED | OUTPATIENT
Start: 2023-09-22 | End: 2023-09-22 | Stop reason: SDUPTHER

## 2023-09-22 RX ORDER — ONDANSETRON 2 MG/ML
4 INJECTION INTRAMUSCULAR; INTRAVENOUS
Status: DISCONTINUED | OUTPATIENT
Start: 2023-09-22 | End: 2023-09-22

## 2023-09-22 RX ORDER — POTASSIUM CHLORIDE 20 MEQ/1
40 TABLET, EXTENDED RELEASE ORAL ONCE
Status: COMPLETED | OUTPATIENT
Start: 2023-09-22 | End: 2023-09-22

## 2023-09-22 RX ORDER — PROPOFOL 10 MG/ML
INJECTION, EMULSION INTRAVENOUS PRN
Status: DISCONTINUED | OUTPATIENT
Start: 2023-09-22 | End: 2023-09-22 | Stop reason: SDUPTHER

## 2023-09-22 RX ORDER — SODIUM CHLORIDE 0.9 % (FLUSH) 0.9 %
5-40 SYRINGE (ML) INJECTION EVERY 12 HOURS SCHEDULED
Status: DISCONTINUED | OUTPATIENT
Start: 2023-09-22 | End: 2023-09-22

## 2023-09-22 RX ORDER — OXYCODONE HYDROCHLORIDE 5 MG/1
5 TABLET ORAL
Status: DISCONTINUED | OUTPATIENT
Start: 2023-09-22 | End: 2023-09-22

## 2023-09-22 RX ORDER — MEPERIDINE HYDROCHLORIDE 50 MG/ML
12.5 INJECTION INTRAMUSCULAR; INTRAVENOUS; SUBCUTANEOUS EVERY 5 MIN PRN
Status: DISCONTINUED | OUTPATIENT
Start: 2023-09-22 | End: 2023-09-22

## 2023-09-22 RX ORDER — IPRATROPIUM BROMIDE AND ALBUTEROL SULFATE 2.5; .5 MG/3ML; MG/3ML
1 SOLUTION RESPIRATORY (INHALATION)
Status: DISCONTINUED | OUTPATIENT
Start: 2023-09-22 | End: 2023-09-22

## 2023-09-22 RX ORDER — PROPRANOLOL HYDROCHLORIDE 10 MG/1
20 TABLET ORAL 2 TIMES DAILY
Status: DISCONTINUED | OUTPATIENT
Start: 2023-09-22 | End: 2023-09-23 | Stop reason: HOSPADM

## 2023-09-22 RX ORDER — HYDROMORPHONE HYDROCHLORIDE 1 MG/ML
0.5 INJECTION, SOLUTION INTRAMUSCULAR; INTRAVENOUS; SUBCUTANEOUS EVERY 4 HOURS PRN
Status: DISCONTINUED | OUTPATIENT
Start: 2023-09-22 | End: 2023-09-23 | Stop reason: HOSPADM

## 2023-09-22 RX ORDER — PROCHLORPERAZINE EDISYLATE 5 MG/ML
5 INJECTION INTRAMUSCULAR; INTRAVENOUS
Status: DISCONTINUED | OUTPATIENT
Start: 2023-09-22 | End: 2023-09-22

## 2023-09-22 RX ORDER — SODIUM CHLORIDE 9 MG/ML
INJECTION, SOLUTION INTRAVENOUS PRN
Status: DISCONTINUED | OUTPATIENT
Start: 2023-09-22 | End: 2023-09-22

## 2023-09-22 RX ORDER — SODIUM CHLORIDE 0.9 % (FLUSH) 0.9 %
5-40 SYRINGE (ML) INJECTION PRN
Status: DISCONTINUED | OUTPATIENT
Start: 2023-09-22 | End: 2023-09-22

## 2023-09-22 RX ADMIN — ONDANSETRON 4 MG: 2 INJECTION INTRAMUSCULAR; INTRAVENOUS at 14:09

## 2023-09-22 RX ADMIN — ONDANSETRON 4 MG: 2 INJECTION INTRAMUSCULAR; INTRAVENOUS at 16:13

## 2023-09-22 RX ADMIN — LIDOCAINE HYDROCHLORIDE 80 MG: 20 INJECTION, SOLUTION INFILTRATION; PERINEURAL at 13:59

## 2023-09-22 RX ADMIN — DEXMEDETOMIDINE HYDROCHLORIDE 12 MCG: 100 INJECTION, SOLUTION INTRAVENOUS at 14:12

## 2023-09-22 RX ADMIN — DEXMEDETOMIDINE HYDROCHLORIDE 8 MCG: 100 INJECTION, SOLUTION INTRAVENOUS at 14:22

## 2023-09-22 RX ADMIN — LABETALOL HYDROCHLORIDE 5 MG: 5 INJECTION, SOLUTION INTRAVENOUS at 14:29

## 2023-09-22 RX ADMIN — DEXMEDETOMIDINE HYDROCHLORIDE 8 MCG: 100 INJECTION, SOLUTION INTRAVENOUS at 14:24

## 2023-09-22 RX ADMIN — SUGAMMADEX 200 MG: 100 INJECTION, SOLUTION INTRAVENOUS at 14:38

## 2023-09-22 RX ADMIN — METRONIDAZOLE 500 MG: 500 INJECTION, SOLUTION INTRAVENOUS at 09:13

## 2023-09-22 RX ADMIN — PROPOFOL 150 MG: 10 INJECTION, EMULSION INTRAVENOUS at 14:00

## 2023-09-22 RX ADMIN — POTASSIUM CHLORIDE 40 MEQ: 1500 TABLET, EXTENDED RELEASE ORAL at 00:17

## 2023-09-22 RX ADMIN — GABAPENTIN 300 MG: 300 CAPSULE ORAL at 18:43

## 2023-09-22 RX ADMIN — HYDROMORPHONE HYDROCHLORIDE 0.5 MG: 1 INJECTION, SOLUTION INTRAMUSCULAR; INTRAVENOUS; SUBCUTANEOUS at 16:10

## 2023-09-22 RX ADMIN — DEXAMETHASONE SODIUM PHOSPHATE 8 MG: 4 INJECTION, SOLUTION INTRAMUSCULAR; INTRAVENOUS at 14:09

## 2023-09-22 RX ADMIN — ROCURONIUM BROMIDE 50 MG: 50 INJECTION, SOLUTION INTRAVENOUS at 14:00

## 2023-09-22 RX ADMIN — PANTOPRAZOLE SODIUM 40 MG: 40 INJECTION, POWDER, FOR SOLUTION INTRAVENOUS at 09:14

## 2023-09-22 RX ADMIN — DEXMEDETOMIDINE HYDROCHLORIDE 8 MCG: 100 INJECTION, SOLUTION INTRAVENOUS at 14:17

## 2023-09-22 RX ADMIN — METRONIDAZOLE 500 MG: 500 INJECTION, SOLUTION INTRAVENOUS at 15:59

## 2023-09-22 RX ADMIN — CEFTRIAXONE SODIUM 2000 MG: 2 INJECTION, POWDER, FOR SOLUTION INTRAMUSCULAR; INTRAVENOUS at 20:02

## 2023-09-22 RX ADMIN — SODIUM CHLORIDE, POTASSIUM CHLORIDE, SODIUM LACTATE AND CALCIUM CHLORIDE: 600; 310; 30; 20 INJECTION, SOLUTION INTRAVENOUS at 15:58

## 2023-09-22 RX ADMIN — POTASSIUM BICARBONATE 60 MEQ: 782 TABLET, EFFERVESCENT ORAL at 01:42

## 2023-09-22 RX ADMIN — DEXMEDETOMIDINE HYDROCHLORIDE 4 MCG: 100 INJECTION, SOLUTION INTRAVENOUS at 14:27

## 2023-09-22 RX ADMIN — PROPOFOL 25 MG: 10 INJECTION, EMULSION INTRAVENOUS at 14:36

## 2023-09-22 RX ADMIN — SODIUM CHLORIDE, SODIUM LACTATE, POTASSIUM CHLORIDE, AND CALCIUM CHLORIDE: .6; .31; .03; .02 INJECTION, SOLUTION INTRAVENOUS at 13:58

## 2023-09-22 RX ADMIN — Medication 10 ML: at 09:14

## 2023-09-22 RX ADMIN — PROPRANOLOL HYDROCHLORIDE 20 MG: 10 TABLET ORAL at 18:44

## 2023-09-22 ASSESSMENT — PAIN SCALES - GENERAL
PAINLEVEL_OUTOF10: 6
PAINLEVEL_OUTOF10: 5
PAINLEVEL_OUTOF10: 2

## 2023-09-22 ASSESSMENT — PAIN DESCRIPTION - DESCRIPTORS
DESCRIPTORS: SHARP
DESCRIPTORS: BURNING

## 2023-09-22 ASSESSMENT — LIFESTYLE VARIABLES: SMOKING_STATUS: 1

## 2023-09-22 ASSESSMENT — PAIN DESCRIPTION - LOCATION
LOCATION: ABDOMEN;BACK
LOCATION: ABDOMEN;BACK

## 2023-09-22 NOTE — PROGRESS NOTES
4 Eyes Skin Assessment     NAME:  Mariia Acuna  YOB: 1970  MEDICAL RECORD NUMBER:  [id number]    The patient is being assessed for  Admission    I agree that at least one RN has performed a thorough Head to Toe Skin Assessment on the patient. ALL assessment sites listed below have been assessed. Areas assessed by both nurses:    Head, Face, Ears, Shoulders, Back, Chest, Arms, Elbows, Hands, Sacrum. Buttock, Coccyx, Ischium, Legs. Feet and Heels, and Under Medical Devices         Does the Patient have a Wound?  No noted wound(s)       Sukh Prevention initiated by RN: No  Wound Care Orders initiated by RN: No    Pressure Injury (Stage 3,4, Unstageable, DTI, NWPT, and Complex wounds) if present, place Wound referral order by RN under : No    New Ostomies, if present place, Ostomy referral order under : No     Nurse 1 eSignature: Electronically signed by Aiyana Ruvalcaba RN on 9/22/23 at 4:41 AM EDT    **SHARE this note so that the co-signing nurse can place an eSignature**    Nurse 2 eSignature: Electronically signed by Yamil Stevens RN on 9/22/23 at 4:43 AM EDT

## 2023-09-22 NOTE — PROGRESS NOTES
Physical Therapy  Facility/Department: [facility]  Physical Therapy Initial Assessment/Tx/Discharge    Name: Royce Birch  : 1970  MRN: [id number]  Date of Service: 2023    Discharge Recommendations:  Home with assist PRN          Patient Diagnosis(es): The encounter diagnosis was Calculus of bile duct without cholangitis with obstruction. Past Medical History:  has a past medical history of Chronic back pain and Liver cirrhosis ([address] Past Surgical History:  has no past surgical history on file. Assessment   Body Structures, Functions, Activity Limitations Requiring Skilled Therapeutic Intervention: Decreased functional mobility ; Decreased endurance  Assessment: Pt presents to [facility] due to choledocholithiasis. PTA, pt lives with girlfriend and performs functional mobility IND with no AD. Currently, pt functioning near baseline level. No further PT needs at this time. Pt reports typically limited by low back pain and overall endurance.  Recommend home with PRN assist upon d/c    Treatment Diagnosis: impaired functional mobility  Therapy Prognosis: Good  Decision Making: Low Complexity  No Skilled PT: Independent with functional mobility   Requires PT Follow-Up: Yes  Activity Tolerance  Activity Tolerance: Patient tolerated evaluation without incident     Plan   Physcial Therapy Plan  General Plan: Discharge  Current Treatment Recommendations: Gait training, Safety education & training, Patient/Caregiver education & training  Safety Devices  Type of Devices: Call light within reach, Left in bed, Nurse notified     Restrictions  Restrictions/Precautions  Restrictions/Precautions: Fall Risk, Up as Tolerated  Position Activity Restriction  Other position/activity restrictions: IV,     Subjective   Pain: Denies pain  General  Chart Reviewed: Yes  Patient assessed for rehabilitation services?: Yes  Response To Previous Treatment: Not applicable  Family / Caregiver Present: No  Referring Practitioner: Aliza Velazquez MD  Referral Date : 09/21/23  Diagnosis: Choledocholithiasis  Follows Commands: Within Functional Limits  General Comment  Comments: RN cleared pt for PT eval  Subjective  Subjective: Pt semi-supine in bed upon arrival, agreeable to PT eval    Social/Functional History  Social/Functional History  Lives With: Significant other  Type of Home: Apartment  Home Layout: One level  Home Access: Stairs to enter without rails  Entrance Stairs - Number of Steps: 2  Bathroom Shower/Tub: Tub/Shower unit  Bathroom Toilet: Standard  Bathroom Equipment: None  Home Equipment: Cane  Has the patient had two or more falls in the past year or any fall with injury in the past year?: No  ADL Assistance: Independent  Homemaking Assistance: Independent  Ambulation Assistance: Independent  Transfer Assistance: Independent  Active : Yes  Occupation: On disability    Vision/Hearing  Vision  Vision: Within Functional Limits  Hearing  Hearing: Exceptions to [facility]  Hearing Exceptions: Hard of hearing/hearing concerns      Cognition   Orientation  Overall Orientation Status: Within Normal Limits  Orientation Level: Oriented X4  Cognition  Overall Cognitive Status: WNL     Objective   Pulse: 70  Heart Rate Source: Monitor  BP: 124/82  BP Location: Left upper arm  BP Method: Automatic  Patient Position: Semi fowlers  MAP (Calculated): 96  Respirations: 16  SpO2: 99 %  O2 Device: None (Room air)    Gross Assessment  AROM: Within functional limits  Strength: Within functional limits  Coordination: Generally decreased, functional  Sensation: Intact     Bed Mobility Training  Bed Mobility Training: Yes  Supine to Sit: Modified independent  Balance  Sitting: Intact  Standing: Intact  Transfer Training  Transfer Training: Yes  Overall Level of Assistance: Independent  Sit to Stand: Independent  Stand to Sit: Independent  Gait Training  Gait Training: Yes  Gait  Overall Level of Assistance: Supervision; Independent (VC for

## 2023-09-22 NOTE — CONSULTS
Consult Placed     Who: gerber  Date:9/21/23  Time:2100     Electronically signed by Valentín Mcmillan on 9/21/2023 at 9:03 PM

## 2023-09-22 NOTE — CARE COORDINATION
Case Management Assessment  Initial Evaluation    Date/Time of Evaluation: 9/22/2023 10:47 AM  Assessment Completed by: Tatiana Corral RN    If patient is discharged prior to next notation, then this note serves as note for discharge by case management. Patient Name: Wendie Moritz                   YOB: 1970  Diagnosis: Choledocholithiasis [K80.50]  Calculus of bile duct without cholangitis with obstruction [K80.51]                   Date / Time: 9/21/2023 12:37 PM    Patient Admission Status: Inpatient   Readmission Risk (Low < 19, Mod (19-27), High > 27): Readmission Risk Score: 7.1    Current PCP: Oswaldo Prince MD  PCP verified by CM? Yes    Chart Reviewed: Yes      History Provided by: Patient  Patient Orientation: Alert and Oriented, Person, Place, Situation, Self    Patient Cognition: Alert    Hospitalization in the last 30 days (Readmission):  No    If yes, Readmission Assessment in CM Navigator will be completed. Advance Directives:      Code Status: Full Code   Patient's Primary Decision Maker is: Legal Next of Kin    Primary Decision Makerkoki OakesNov[facility] [place] - [phone]    Discharge Planning:    Patient lives with: Spouse/Significant Other Type of Home: House  Primary Care Giver: Self  Patient Support Systems include: Spouse/Significant Other, Children   Current Financial resources: Medicare  Current community resources: None  Current services prior to admission: None            Current DME:              Type of Home Care services:  None    ADLS  Prior functional level: Independent in ADLs/IADLs  Current functional level: Independent in ADLs/IADLs    PT AM-PAC: 24 /24  OT AM-PAC: 24 /24    Family can provide assistance at DC: Yes  Would you like Case Management to discuss the discharge plan with any other family members/significant others, and if so, who?  No  Plans to Return to Present Housing: Yes  Other Identified Issues/Barriers to RETURNING to current housing: none  Potential Assistance needed at discharge: N/A            Potential DME:    Patient expects to discharge to: [address] [facility] for transportation at discharge:      Financial    Payor: Kalina Rubalcava / Plan: Jos Camarillo ESSENTIAL/PLUS / Product Type: *No Product type* /     Does insurance require precert for SNF: Yes    Potential assistance Purchasing Medications: No  Meds-to-Beds request: Yes      [id number] [place]vonne Is[place]ro, [place] [address] [facility] [address]  Phone: [phone] Fax: [phone]      Notes:    Factors facilitating achievement of predicted outcomes: Friend support, Motivated, Cooperative, Pleasant, Sense of humor, and Good insight into deficits    Barriers to discharge: none    Additional Case Management Notes: spoke with patient. Reported IPTA, lives in apt with girlfriend, 2STE. Active , denied any DCP needs. GI and surgery consults pending. Will follow Archie Ibarra RN      The Plan for Transition of Care is related to the following treatment goals of Choledocholithiasis [K80.50]  Calculus of bile duct without cholangitis with obstruction [A30.20]    IF APPLICABLE: The Patient and/or patient representative Ni Olivas and his family were provided with a choice of provider and agrees with the discharge plan. Freedom of choice list with basic dialogue that supports the patient's individualized plan of care/goals and shares the quality data associated with the providers was provided to:     Patient Representative Name:       The Patient and/or Patient Representative Agree with the Discharge Plan?       Archie Ibarra RN  Case Management Department

## 2023-09-22 NOTE — OP NOTE
ERCP and EGD procedure note. Indication: 68-year-old male admitted with abdominal pain, abnormal LFTs, CT scanning revealing evidence of choledocholithiasis. In addition, the patient has a history of cirrhosis of the liver and esophageal varices. He had banding years ago. He presents for an ERCP and an upper endoscopy to screen for esophageal varices. Risks, benefits, alternatives were discussed with patient informed consent was obtained. Risks including the risk of pancreatitis, bowel perforation, bleeding and infection were all discussed. The patient is currently on IV antibiotics. Anesthesia: The procedures were performed under general anesthesia. Specimen: None. Instruments: ,     Fluoroscopy: All fluoroscopic images and still X-ray films were carefully reviewed and interpreted by the endoscopist during the procedure in real time, in the absence of a radiologist. These interpretations guided the course of the procedure and the interventions described. Procedure description: The [employer] duodenoscope was passed under direct vision into the esophagus and advanced to the descending duodenum. The ampulla Vater was easily identified. A 39 Jagtome with a 0.025 wire were passed through the channel. The common bile duct was easily accessed. The wire was advanced into the intrahepatic biliary tree. Injection of contrast revealed a minimally dilated distal common bile duct with filling defects consistent with stones. The cystic duct was opacified. The gallbladder was also placed opacifed. Multiple filling defects were noted in the gallbladder consistent with the known cholelithiasis. Next, we proceeded with a sphincterotomy. A sphincterotomy was performed in the direction of the bile duct. Next, a stone extraction balloon, 9-12 mm was passed over the wire. The balloon was advanced to the biliary bifurcation.   Multiple sweeps of the common hepatic and common

## 2023-09-22 NOTE — CONSULTS
Consultation Note    Patient Name: Alton Mclain  : 1970  Age: 47 y.o. Admitting Physician: Sheree Luna MD   Date of Admission: 2023 12:37 PM   Primary Care Physician: Marychuy Barrios MD        Alton Mclain is being seen at the request of Sheree Luna MD for cholelithiasis. History of Present Illness:  Avi Muñoz is a 53year old male with a PMHx significant for chronic back pain, alcohol abuse and liver cirrhosis. He presented to the ED on  with complaints of RUQ abdominal cramps, muscle pain, nausea, shakes, sweats, and diarrhea since . Initially, he thought he had the flu, however, he noticed his skin was yellowing so he came to the ED. Work up in the ED showed hypokalemia - 2.8; Normal transaminases; Elevated total bilirubin of 5; mild leukocytosis of 11.8; INR 1.26. COVID-19 negative; Flu A/B negative. History of cirrhosis but has not decompensated. Reportedly, he hasn't had a drink in over 10 years    CT Abd/pelvis :   IMPRESSION:  1. Numerous gallstones within the gallbladder lumen. The gallbladder wall is thickened up to 4 mm. This may represent acute cholecystitis. Recommend further evaluation with right upper quadrant ultrasound. 2.  3 small stones are seen within the distal common bile duct near the ampulla measuring up to 3 mm. The common bile duct is at the upper limits of normal measuring 6 mm. This may represent developing common bile duct  obstruction. 3.  Findings of hepatic cirrhosis with sequela of portal hypertension. RUQ abdominal ultrasound :   IMPRESSION:  Cholelithiasis. No cholecystitis. Lobular contour to the liver, compatible with cirrhosis. No biliary ductal  dilatation or mass. GI History:  No EGD or colonoscopy reports found during chart review. However, there's reported EGD with banding in .      Past Medical History:  Past Medical History:   Diagnosis Date    Chronic back pain     Liver cirrhosis ([address])         Past

## 2023-09-22 NOTE — PROGRESS NOTES
V2.0    [facility] Progress Note      Name:  Marcela Briscoe /Age/Sex: 1970  (47 y.o. male)   MRN & CSN:  [id number] & [id number] Encounter Date/Time: 2023 7:43 AM EDT   Location:  5772/5366-49 PCP: Mela Mayers MD     Attending:Abena Samuel MD       Hospital Day: 2    Assessment and Recommendations   Marcela Briscoe is a 47 y.o. male with pmh of chronic back pain and liver cirrhosis who presents with Choledocholithiasis    Plan:     Choledocholithiasis with obstruction  Alcoholic cirrhosis with history of esophageal varices and banding  - CT abdomen showed numerous gallstones within the gallbladder lumen, thickened gallbladder wall possibly indicating acute cholecystitis. 3 small stones are seen within the distal common bile duct near the ampulla measuring up to 3 mm, representing developing common bile duct obstruction. Hepatic cirrhosis with sequela of portal hypertension. RUQ US showed cholelithiasis, no cholecystitis, cirrhosis. - B12/folate pending  - Continue ceftriaxone and Flagyl  - Continue IV fluids  - General surgery consulted  - Per note, patient declines cholecystectomy. Re-discussed with patient to clarify, and he is agreeable to cholecystectomy if necessary.  Talked to nurse who will page surgeon to ensure clear plan of care moving forward  - GI consulted, pending recommendations for possible ERCP    Hypokalemia (resolved)  - K 2.9 on   - 4.0 on     Diet Diet NPO Exceptions are: Ice Chips, Sips of Water with Meds   DVT Prophylaxis [x] Lovenox, []  Heparin, [] SCDs, [] Ambulation,  [] Eliquis, [] Xarelto  [] Coumadin   Code Status Full Code   Disposition From: home  Expected Disposition: TBD  Estimated Date of Discharge: 2-3 days  Patient requires continued admission due to choledocholithiasis, GI consult   Surrogate Decision Maker/ POA  none     Personally reviewed Lab Studies and Imaging     Subjective:     Chief Complaint: generalized weakness    Marcela Briscoe is a 47 y.o. male who Contrast?->None Reason for exam:->RUQ pain, jaundice, hx of cirrhosis Decision Support Exception - unselect if not a suspected or confirmed emergency medical condition->Emergency Medical Condition (MA) Reason for Exam: RUQ pain since sunday-nausea-chills-diarrhea Additional signs and symptoms: hx-cirrhosis Relevant Medical/Surgical History: no surg FINDINGS: Lower Chest: No acute infiltrate or pleural effusion. Mild right basilar atelectasis. Organs: There are numerous gallstones within the gallbladder lumen. The gallbladder wall is thickened up to 4 mm. No significant surrounding pericholecystic inflammatory change. No free fluid around the gallbladder. There are 3 stones within the distal common bile duct near the ampulla measuring up to 3 mm. The common bile duct is at the upper limits of normal measuring up to 6 mm. Diffusely nodular surface contour of the liver consistent with cirrhosis. No focal liver lesions or intrahepatic biliary ductal dilatation. The portal vein is patent. There is recanalization of the umbilical vein. The spleen and pancreas are normal.  The adrenal glands are normal.  3 mm nonobstructing left renal stone. The right kidney is unremarkable GI/Bowel: The distal esophagus and stomach are normal.  Small bowel loops show no evidence of obstruction or inflammation. The colon shows no evidence of wall thickening or inflammatory change. Normal appendix. Pelvis: The urinary bladder and prostate are normal.  No free fluid. Peritoneum/Retroperitoneum: The aorta is normal caliber. No lymphadenopathy. Bones/Soft Tissues: No suspicious osseous lesion. 1.  Numerous gallstones within the gallbladder lumen. The gallbladder wall is thickened up to 4 mm. This may represent acute cholecystitis. Recommend further evaluation with right upper quadrant ultrasound. 2.  3 small stones are seen within the distal common bile duct near the ampulla measuring up to 3 mm.   The common bile duct is at the upper limits of normal measuring 6 mm. This may represent developing common bile duct obstruction. 3.  Findings of hepatic cirrhosis with sequela of portal hypertension. CBC:   Recent Labs     09/21/23  1253 09/22/23  0544   WBC 11.8* 5.7   HGB 16.9 14.0   * 73*     BMP:    Recent Labs     09/21/23  1253 09/22/23  0018 09/22/23  0544   *  --  137   K 2.9* 3.0* 4.0   CL 95*  --  104   CO2 25  --  25   BUN 18  --  14   CREATININE 0.9  --  0.7*   GLUCOSE 148*  --  103*     Hepatic:   Recent Labs     09/21/23  1253   AST 24   ALT 27   BILITOT 5.0*   ALKPHOS 77     Lipids: No results found for: \"CHOL\", \"HDL\", \"TRIG\"  Hemoglobin A1C: No results found for: \"LABA1C\"  TSH: No results found for: \"TSH\"  Troponin: No results found for: \"TROPONINT\"  Lactic Acid: No results for input(s): \"LACTA\" in the last 72 hours. BNP: No results for input(s): \"PROBNP\" in the last 72 hours.   UA:  Lab Results   Component Value Date/Time    NITRU POSITIVE 09/21/2023 07:38 PM    COLORU DARK YELLOW 09/21/2023 07:38 PM    PHUR 6.5 09/21/2023 07:38 PM    WBCUA 0-2 09/21/2023 07:38 PM    RBCUA 0-2 09/21/2023 07:38 PM    BACTERIA Rare 09/21/2023 07:38 PM    CLARITYU Clear 09/21/2023 07:38 PM    SPECGRAV 1.010 09/21/2023 07:38 PM    LEUKOCYTESUR Negative 09/21/2023 07:38 PM    UROBILINOGEN >=8.0 09/21/2023 07:38 PM    BILIRUBINUR MODERATE 09/21/2023 07:38 PM    BLOODU Negative 09/21/2023 07:38 PM    GLUCOSEU 100 09/21/2023 07:38 PM    KETUA 15 09/21/2023 07:38 PM     Urine Cultures: No results found for: \"LABURIN\"  Blood Cultures: No results found for: \"BC\"  No results found for: \"BLOODCULT2\"  Organism: No results found for: \"ORG\"    Loly Roman DO, PGY-1  [facility] Medicine Residency

## 2023-09-22 NOTE — PROGRESS NOTES
Occupational Therapy  Facility/Department: [facility] C3 TELE/MED SURG/ONC  Occupational Therapy Initial Assessment and Treatment Note 1x    Name: Shivam Dos Santos  : 1970  MRN: [id number]  Date of Service: 2023    Discharge Recommendations:  Home with assist PRN      Patient Diagnosis(es): The encounter diagnosis was Calculus of bile duct without cholangitis with obstruction. Past Medical History:  has a past medical history of Chronic back pain and Liver cirrhosis ([address]). Past Surgical History:  has no past surgical history on file. Assessment   Assessment: OT eval and tx completed. Pt admitted for choledocholithiasis. He is independent at baseline. During OT, pt demonstrated intact UE strength/coordination, balance and motor skills. He is independent for UE/LE ADLs. Ambulated in and out of room without device, independent for transfers. No c/o pain. Further OT is not needed. OT will sign off. Decision Making: Low Complexity  REQUIRES OT FOLLOW-UP: No  Activity Tolerance  Activity Tolerance: Patient Tolerated treatment well        Plan Restrictions  Restrictions/Precautions  Restrictions/Precautions: Fall Risk, Up as Tolerated  Position Activity Restriction  Other position/activity restrictions: IV,    Subjective   General  Chart Reviewed: Yes  Patient assessed for rehabilitation services?: Yes  Family / Caregiver Present: No  Referring Practitioner: MARCUS Samuel  Diagnosis: choledocholithiasis  Subjective  Subjective: Pt ageeable to OT.  Denies pain  General Comment  Comments: RN approved therapy  Denies pain  Social/Functional History  Social/Functional History  Lives With: Significant other  Type of Home: Apartment  Home Layout: One level  Home Access: Stairs to enter without rails  Entrance Stairs - Number of Steps: 2  Bathroom Shower/Tub: Tub/Shower unit  Bathroom Toilet: Standard  Bathroom Equipment: None  Home Equipment: Carolynne Felty  Has the patient had two or more falls in the past year or any fall with injury (09/22/23 4297)    Goals  Short Term Goals  Time Frame for Short Term Goals: 1x--all goals met 9/22  Short Term Goal 1: Perform UE/LE ADLs at Ind level--goal met  Short Term Goal 2: Perform functional transfers at Ind level--goal met  Short Term Goal 3: Demo intact standing balance for ADLs--goal met     Therapy Time   Individual Concurrent Group Co-treatment   Time In 0800         Time Out 0824         Minutes 24         Timed Code Treatment Minutes: 14 Minutes (10 min eval)     Juan R Camargo OT

## 2023-09-22 NOTE — ANESTHESIA POSTPROCEDURE EVALUATION
Department of Anesthesiology  Postprocedure Note    Patient: Kam Lopez  MRN: [id number]  YOB: 1970  Date of evaluation: 9/22/2023      Procedure Summary     Date: 09/22/23 Room / Location: [address] [place] [address] / [address] [place] [address]    Anesthesia Start: 1534 Anesthesia Stop: 1449    Procedures:       ERCP ENDOSCOPIC RETROGRADE CHOLANGIOPANCREATOGRAPHY      ERCP STONE REMOVAL      ERCP SPHINCTER/PAPILLOTOMY      ERCP STENT INSERTION      EGD ESOPHAGOGASTRODUODENOSCOPY Diagnosis:       Choledocholithiasis      (Choledocholithiasis [K80.50])    Surgeons: Pooja Jane MD Responsible Provider: Ratna Christianson MD    Anesthesia Type: general ASA Status: 3          Anesthesia Type: No value filed.     Hailee Phase I: Hailee Score: 10    Hailee Phase II:        Anesthesia Post Evaluation    Patient location during evaluation: PACU  Patient participation: complete - patient participated  Level of consciousness: awake and alert  Airway patency: patent  Nausea & Vomiting: no nausea and no vomiting  Complications: no  Cardiovascular status: hemodynamically stable  Respiratory status: acceptable  Hydration status: euvolemic  Pain management: adequate

## 2023-09-22 NOTE — PROGRESS NOTES
Pt a/o. VSS. Shift assessment complete and charted. BS hypoactive. Pt states pain is much improved and denied n/v. Pt NPO for ERCP V  Magaly. Pt states he asked surgery if magaly is needed and if that would be best for him and that he wanted to talk to GI first. ERCP scheduled and transport here to pick pt up. Hospitalist to bedside to explain that ERCP is necessary to clear stones from duct and the need for magaly would be evaluated after. Pt ok with explanation. Pt stable and denied needs when leaving with transport.

## 2023-09-23 VITALS
RESPIRATION RATE: 16 BRPM | HEART RATE: 68 BPM | HEIGHT: 69 IN | WEIGHT: 170 LBS | SYSTOLIC BLOOD PRESSURE: 102 MMHG | TEMPERATURE: 97.6 F | DIASTOLIC BLOOD PRESSURE: 64 MMHG | OXYGEN SATURATION: 96 % | BODY MASS INDEX: 25.18 KG/M2

## 2023-09-23 LAB
ALBUMIN SERPL-MCNC: 3 G/DL (ref 3.4–5)
ALP SERPL-CCNC: 55 U/L (ref 40–129)
ALT SERPL-CCNC: 16 U/L (ref 10–40)
ANION GAP SERPL CALCULATED.3IONS-SCNC: 8 MMOL/L (ref 3–16)
AST SERPL-CCNC: 15 U/L (ref 15–37)
BILIRUB DIRECT SERPL-MCNC: 0.6 MG/DL (ref 0–0.3)
BILIRUB INDIRECT SERPL-MCNC: 0.8 MG/DL (ref 0–1)
BILIRUB SERPL-MCNC: 1.4 MG/DL (ref 0–1)
BUN SERPL-MCNC: 9 MG/DL (ref 7–20)
CALCIUM SERPL-MCNC: 8.5 MG/DL (ref 8.3–10.6)
CHLORIDE SERPL-SCNC: 104 MMOL/L (ref 99–110)
CO2 SERPL-SCNC: 25 MMOL/L (ref 21–32)
CREAT SERPL-MCNC: 0.7 MG/DL (ref 0.9–1.3)
DEPRECATED RDW RBC AUTO: 12.6 % (ref 12.4–15.4)
GFR SERPLBLD CREATININE-BSD FMLA CKD-EPI: >60 ML/MIN/{1.73_M2}
GLUCOSE SERPL-MCNC: 138 MG/DL (ref 70–99)
HCT VFR BLD AUTO: 40.4 % (ref 40.5–52.5)
HGB BLD-MCNC: 14.5 G/DL (ref 13.5–17.5)
MAGNESIUM SERPL-MCNC: 1.9 MG/DL (ref 1.8–2.4)
MCH RBC QN AUTO: 30.2 PG (ref 26–34)
MCHC RBC AUTO-ENTMCNC: 35.9 G/DL (ref 31–36)
MCV RBC AUTO: 84.2 FL (ref 80–100)
PHOSPHATE SERPL-MCNC: 3.4 MG/DL (ref 2.5–4.9)
PLATELET # BLD AUTO: 88 K/UL (ref 135–450)
PMV BLD AUTO: 7.9 FL (ref 5–10.5)
POTASSIUM SERPL-SCNC: 4 MMOL/L (ref 3.5–5.1)
PROT SERPL-MCNC: 5.5 G/DL (ref 6.4–8.2)
RBC # BLD AUTO: 4.79 M/UL (ref 4.2–5.9)
SODIUM SERPL-SCNC: 137 MMOL/L (ref 136–145)
WBC # BLD AUTO: 6.2 K/UL (ref 4–11)

## 2023-09-23 PROCEDURE — 36415 COLL VENOUS BLD VENIPUNCTURE: CPT

## 2023-09-23 PROCEDURE — 83735 ASSAY OF MAGNESIUM: CPT

## 2023-09-23 PROCEDURE — G0378 HOSPITAL OBSERVATION PER HR: HCPCS

## 2023-09-23 PROCEDURE — 6370000000 HC RX 637 (ALT 250 FOR IP): Performed by: INTERNAL MEDICINE

## 2023-09-23 PROCEDURE — 85027 COMPLETE CBC AUTOMATED: CPT

## 2023-09-23 PROCEDURE — 2580000003 HC RX 258: Performed by: INTERNAL MEDICINE

## 2023-09-23 PROCEDURE — 6360000002 HC RX W HCPCS: Performed by: INTERNAL MEDICINE

## 2023-09-23 PROCEDURE — 84100 ASSAY OF PHOSPHORUS: CPT

## 2023-09-23 PROCEDURE — C9113 INJ PANTOPRAZOLE SODIUM, VIA: HCPCS | Performed by: INTERNAL MEDICINE

## 2023-09-23 PROCEDURE — 80076 HEPATIC FUNCTION PANEL: CPT

## 2023-09-23 PROCEDURE — 80048 BASIC METABOLIC PNL TOTAL CA: CPT

## 2023-09-23 RX ADMIN — Medication 10 ML: at 08:29

## 2023-09-23 RX ADMIN — PROPRANOLOL HYDROCHLORIDE 20 MG: 10 TABLET ORAL at 08:26

## 2023-09-23 RX ADMIN — METRONIDAZOLE 500 MG: 500 INJECTION, SOLUTION INTRAVENOUS at 08:23

## 2023-09-23 RX ADMIN — GABAPENTIN 300 MG: 300 CAPSULE ORAL at 08:26

## 2023-09-23 RX ADMIN — PANTOPRAZOLE SODIUM 40 MG: 40 INJECTION, POWDER, FOR SOLUTION INTRAVENOUS at 08:26

## 2023-09-23 RX ADMIN — METRONIDAZOLE 500 MG: 500 INJECTION, SOLUTION INTRAVENOUS at 00:22

## 2023-09-23 RX ADMIN — SODIUM CHLORIDE, POTASSIUM CHLORIDE, SODIUM LACTATE AND CALCIUM CHLORIDE: 600; 310; 30; 20 INJECTION, SOLUTION INTRAVENOUS at 07:30

## 2023-09-23 NOTE — PROGRESS NOTES
Head to toe completed and documented. Pt alert and oriented. States he has some pain, refused pharmacological intervention at this time, he stated it was \"not too bad\". Pt denies needs at this time. Call bell in reach.

## 2023-09-23 NOTE — PLAN OF CARE
Problem: Pain  Goal: Verbalizes/displays adequate comfort level or baseline comfort level  Outcome: Progressing  Flowsheets (Taken 9/22/2023 2214)  Verbalizes/displays adequate comfort level or baseline comfort level:   Encourage patient to monitor pain and request assistance   Administer analgesics based on type and severity of pain and evaluate response   Implement non-pharmacological measures as appropriate and evaluate response   Consider cultural and social influences on pain and pain management   Notify Licensed Independent Practitioner if interventions unsuccessful or patient reports new pain   Assess pain using appropriate pain scale     Problem: Safety - Adult  Goal: Free from fall injury  Outcome: Progressing  Flowsheets (Taken 9/22/2023 2214)  Free From Fall Injury:   Instruct family/caregiver on patient safety   Based on caregiver fall risk screen, instruct family/caregiver to ask for assistance with transferring infant if caregiver noted to have fall risk factors

## 2023-09-23 NOTE — DISCHARGE INSTR - DIET

## 2023-09-23 NOTE — PROGRESS NOTES
Discharge education provided both verbally and written, patient verbalized understanding, denies questions. Education on low fat diet, ERCP post op and biliary stent placement provided along with f/u information for general surgery and GI.  PIV removed without complications. Patient left with all belongings including phone and . VSS. Left via private vehicle.

## 2023-09-23 NOTE — PLAN OF CARE
Problem: Pain  Goal: Verbalizes/displays adequate comfort level or baseline comfort level  9/23/2023 1308 by Erica Nuñez RN  Outcome: Adequate for Discharge  9/23/2023 5695 by Erica Nuñez RN  Outcome: Progressing     Problem: Safety - Adult  Goal: Free from fall injury  9/23/2023 1308 by Erica Nuñez RN  Outcome: Adequate for Discharge  9/23/2023 5406 by Erica Nuñez RN  Outcome: Progressing

## 2023-09-23 NOTE — PLAN OF CARE
Problem: Safety - Adult  Goal: Free from fall injury  9/23/2023 0833 by Hanna Sutton RN  Outcome: Progressing  9/22/2023 2214 by Eamon Poor  Outcome: Progressing  Flowsheets (Taken 9/22/2023 2214)  Free From Fall Injury:   Instruct family/caregiver on patient safety   Based on caregiver fall risk screen, instruct family/caregiver to ask for assistance with transferring infant if caregiver noted to have fall risk factors     Problem: Pain  Goal: Verbalizes/displays adequate comfort level or baseline comfort level  9/23/2023 0833 by Hanna Sutton RN  Outcome: Progressing  9/22/2023 2214 by Eamon Poor  Outcome: Progressing  Flowsheets (Taken 9/22/2023 2214)  Verbalizes/displays adequate comfort level or baseline comfort level:   Encourage patient to monitor pain and request assistance   Administer analgesics based on type and severity of pain and evaluate response   Implement non-pharmacological measures as appropriate and evaluate response   Consider cultural and social influences on pain and pain management   Notify Licensed Independent Practitioner if interventions unsuccessful or patient reports new pain   Assess pain using appropriate pain scale

## 2023-09-23 NOTE — PROGRESS NOTES
Assessment completed and documented. VSS. A/ox4. Denies pain. Tolerating clear liquid diet. IV fluids running through PIV. Ambulates independently without assistive devices. Bed locked and in lowest position. Bedside table and call light within reach. Denies further needs at this time.

## 2023-09-25 LAB
BACTERIA BLD CULT ORG #2: NORMAL
BACTERIA BLD CULT: NORMAL

## 2023-09-26 NOTE — DISCHARGE SUMMARY
V2.0  Discharge Summary    Name:  Hernando Fernandez /Age/Sex: 1970 (47 y.o. male)   Admit Date: 2023  Discharge Date: 23    MRN & CSN:  [id number] & [id number] Encounter Date and Time 23 12:43 PM EDT    Attending:  No att. providers found Discharging Provider: Esther Diaz MD       Hospital Course:     Brief HPI: Hernando Fernandez is a 47 y.o. male who presented with jaundice right upper quadrant pain    Brief Problem Based Course:   58-year-old male with history of cirrhosis presented to the hospital, right upper quadrant abdominal pain. Started on antibiotics. Patient has choledocholithiasis based on the results of the images. Patient will be going for ERCP today for consideration of stent placement. Had a stent placed tolerated the procedure well. Patient will be deemed to be stable for discharge with recommendation to follow-up with general surgery and GI in the outpatient basis. Patient has a plan for interval laparoscopic cholecystectomy done outpatient. All question concerns were addressed at the time of discharge. The patient expressed appropriate understanding of, and agreement with the discharge recommendations, medications, and plan.      Consults this admission:  IP CONSULT TO GENERAL SURGERY  IP CONSULT TO HOSPITALIST  IP CONSULT TO GI    Discharge Diagnosis:   Choledocholithiasis  Status post ERCP with stent    Discharge Instruction:   Follow up appointments: PCP GI, general surgery  Primary care physician: Tal Jordan MD within 2 weeks  Diet: cardiac diet   Activity: activity as tolerated  Disposition: Discharged to:   [x]Home, []C, []SNF, []Acute Rehab, []Hospice   Condition on discharge: Stable  Labs and Tests to be Followed up as an outpatient by PCP or Specialist:     Discharge Medications:        Medication List        CONTINUE taking these medications      gabapentin 300 MG capsule  Commonly known as: NEURONTIN     lactulose 10 GM/15ML solution  Commonly known as:

## 2023-09-28 ENCOUNTER — TELEPHONE (OUTPATIENT)
Dept: SURGERY | Age: 53
End: 2023-09-28

## 2023-09-28 NOTE — TELEPHONE ENCOUNTER
Called pt to sched his GB removal in 2-3 weeks per Dr Shama Mishra - Pt said he is already scheduled at [address] [facility] - I informed pt I would let Dr Shama Mishra know

## 2023-11-14 RX ORDER — METHOCARBAMOL 500 MG/1
500 TABLET, FILM COATED ORAL 4 TIMES DAILY
COMMUNITY

## 2023-11-14 RX ORDER — OXYCODONE HYDROCHLORIDE 5 MG/1
5 TABLET ORAL EVERY 4 HOURS PRN
COMMUNITY
End: 2023-11-17

## 2023-11-14 RX ORDER — ONDANSETRON 4 MG/1
4 TABLET, FILM COATED ORAL EVERY 8 HOURS PRN
COMMUNITY

## 2023-11-17 ENCOUNTER — ANESTHESIA EVENT (OUTPATIENT)
Dept: ENDOSCOPY | Age: 53
End: 2023-11-17
Payer: MEDICARE

## 2023-11-20 ENCOUNTER — ANESTHESIA (OUTPATIENT)
Dept: ENDOSCOPY | Age: 53
End: 2023-11-20
Payer: MEDICARE

## 2023-11-20 ENCOUNTER — APPOINTMENT (OUTPATIENT)
Dept: GENERAL RADIOLOGY | Age: 53
End: 2023-11-20
Attending: INTERNAL MEDICINE
Payer: MEDICARE

## 2023-11-20 ENCOUNTER — HOSPITAL ENCOUNTER (OUTPATIENT)
Age: 53
Setting detail: OUTPATIENT SURGERY
Discharge: HOME | End: 2023-11-20
Attending: INTERNAL MEDICINE | Admitting: INTERNAL MEDICINE
Payer: MEDICARE

## 2023-11-20 VITALS
OXYGEN SATURATION: 95 % | BODY MASS INDEX: 23.7 KG/M2 | HEIGHT: 69 IN | HEART RATE: 65 BPM | TEMPERATURE: 97 F | RESPIRATION RATE: 18 BRPM | DIASTOLIC BLOOD PRESSURE: 82 MMHG | WEIGHT: 160 LBS | SYSTOLIC BLOOD PRESSURE: 135 MMHG

## 2023-11-20 DIAGNOSIS — R52 PAIN: ICD-10-CM

## 2023-11-20 LAB
ANION GAP SERPL CALCULATED.3IONS-SCNC: 11 MMOL/L (ref 3–16)
BUN SERPL-MCNC: 9 MG/DL (ref 7–20)
CALCIUM SERPL-MCNC: 9.1 MG/DL (ref 8.3–10.6)
CHLORIDE SERPL-SCNC: 103 MMOL/L (ref 99–110)
CO2 SERPL-SCNC: 25 MMOL/L (ref 21–32)
CREAT SERPL-MCNC: 0.7 MG/DL (ref 0.9–1.3)
GFR SERPLBLD CREATININE-BSD FMLA CKD-EPI: >60 ML/MIN/{1.73_M2}
GLUCOSE SERPL-MCNC: 198 MG/DL (ref 70–99)
POTASSIUM SERPL-SCNC: 4 MMOL/L (ref 3.5–5.1)
SODIUM SERPL-SCNC: 139 MMOL/L (ref 136–145)

## 2023-11-20 PROCEDURE — 7100000000 HC PACU RECOVERY - FIRST 15 MIN: Performed by: INTERNAL MEDICINE

## 2023-11-20 PROCEDURE — 74328 X-RAY BILE DUCT ENDOSCOPY: CPT

## 2023-11-20 PROCEDURE — 3609015000 HC ERCP REMOVE FOREIGN BODY/STENT BILIARY/PANC DUCT: Performed by: INTERNAL MEDICINE

## 2023-11-20 PROCEDURE — 3700000001 HC ADD 15 MINUTES (ANESTHESIA): Performed by: INTERNAL MEDICINE

## 2023-11-20 PROCEDURE — 7100000010 HC PHASE II RECOVERY - FIRST 15 MIN: Performed by: INTERNAL MEDICINE

## 2023-11-20 PROCEDURE — 80048 BASIC METABOLIC PNL TOTAL CA: CPT

## 2023-11-20 PROCEDURE — 3700000000 HC ANESTHESIA ATTENDED CARE: Performed by: INTERNAL MEDICINE

## 2023-11-20 PROCEDURE — 2500000003 HC RX 250 WO HCPCS: Performed by: NURSE ANESTHETIST, CERTIFIED REGISTERED

## 2023-11-20 PROCEDURE — 6360000002 HC RX W HCPCS: Performed by: NURSE ANESTHETIST, CERTIFIED REGISTERED

## 2023-11-20 PROCEDURE — 3609018800 HC ERCP DX COLLECTION SPECIMEN BRUSHING/WASHING: Performed by: INTERNAL MEDICINE

## 2023-11-20 PROCEDURE — 2720000010 HC SURG SUPPLY STERILE: Performed by: INTERNAL MEDICINE

## 2023-11-20 PROCEDURE — 2709999900 HC NON-CHARGEABLE SUPPLY: Performed by: INTERNAL MEDICINE

## 2023-11-20 PROCEDURE — 2580000003 HC RX 258: Performed by: ANESTHESIOLOGY

## 2023-11-20 PROCEDURE — 7100000001 HC PACU RECOVERY - ADDTL 15 MIN: Performed by: INTERNAL MEDICINE

## 2023-11-20 RX ORDER — OXYCODONE HYDROCHLORIDE 5 MG/1
10 TABLET ORAL PRN
Status: DISCONTINUED | OUTPATIENT
Start: 2023-11-20 | End: 2023-11-20 | Stop reason: HOSPADM

## 2023-11-20 RX ORDER — SODIUM CHLORIDE 0.9 % (FLUSH) 0.9 %
5-40 SYRINGE (ML) INJECTION PRN
Status: DISCONTINUED | OUTPATIENT
Start: 2023-11-20 | End: 2023-11-20 | Stop reason: HOSPADM

## 2023-11-20 RX ORDER — LIDOCAINE HYDROCHLORIDE 20 MG/ML
INJECTION, SOLUTION EPIDURAL; INFILTRATION; INTRACAUDAL; PERINEURAL PRN
Status: DISCONTINUED | OUTPATIENT
Start: 2023-11-20 | End: 2023-11-20 | Stop reason: SDUPTHER

## 2023-11-20 RX ORDER — PROPOFOL 10 MG/ML
INJECTION, EMULSION INTRAVENOUS PRN
Status: DISCONTINUED | OUTPATIENT
Start: 2023-11-20 | End: 2023-11-20 | Stop reason: SDUPTHER

## 2023-11-20 RX ORDER — FENTANYL CITRATE 50 UG/ML
INJECTION, SOLUTION INTRAMUSCULAR; INTRAVENOUS PRN
Status: DISCONTINUED | OUTPATIENT
Start: 2023-11-20 | End: 2023-11-20 | Stop reason: SDUPTHER

## 2023-11-20 RX ORDER — DEXAMETHASONE SODIUM PHOSPHATE 4 MG/ML
INJECTION, SOLUTION INTRA-ARTICULAR; INTRALESIONAL; INTRAMUSCULAR; INTRAVENOUS; SOFT TISSUE PRN
Status: DISCONTINUED | OUTPATIENT
Start: 2023-11-20 | End: 2023-11-20 | Stop reason: SDUPTHER

## 2023-11-20 RX ORDER — HYDROMORPHONE HYDROCHLORIDE 1 MG/ML
0.25 INJECTION, SOLUTION INTRAMUSCULAR; INTRAVENOUS; SUBCUTANEOUS EVERY 5 MIN PRN
Status: DISCONTINUED | OUTPATIENT
Start: 2023-11-20 | End: 2023-11-20 | Stop reason: HOSPADM

## 2023-11-20 RX ORDER — ROCURONIUM BROMIDE 10 MG/ML
INJECTION, SOLUTION INTRAVENOUS PRN
Status: DISCONTINUED | OUTPATIENT
Start: 2023-11-20 | End: 2023-11-20 | Stop reason: SDUPTHER

## 2023-11-20 RX ORDER — LIDOCAINE HYDROCHLORIDE 10 MG/ML
0.3 INJECTION, SOLUTION EPIDURAL; INFILTRATION; INTRACAUDAL; PERINEURAL
Status: DISCONTINUED | OUTPATIENT
Start: 2023-11-20 | End: 2023-11-20 | Stop reason: HOSPADM

## 2023-11-20 RX ORDER — SODIUM CHLORIDE 9 MG/ML
INJECTION, SOLUTION INTRAVENOUS PRN
Status: DISCONTINUED | OUTPATIENT
Start: 2023-11-20 | End: 2023-11-20 | Stop reason: HOSPADM

## 2023-11-20 RX ORDER — SODIUM CHLORIDE, SODIUM LACTATE, POTASSIUM CHLORIDE, CALCIUM CHLORIDE 600; 310; 30; 20 MG/100ML; MG/100ML; MG/100ML; MG/100ML
INJECTION, SOLUTION INTRAVENOUS CONTINUOUS
Status: DISCONTINUED | OUTPATIENT
Start: 2023-11-20 | End: 2023-11-20 | Stop reason: HOSPADM

## 2023-11-20 RX ORDER — SODIUM CHLORIDE 0.9 % (FLUSH) 0.9 %
5-40 SYRINGE (ML) INJECTION EVERY 12 HOURS SCHEDULED
Status: DISCONTINUED | OUTPATIENT
Start: 2023-11-20 | End: 2023-11-20 | Stop reason: HOSPADM

## 2023-11-20 RX ORDER — ONDANSETRON 2 MG/ML
4 INJECTION INTRAMUSCULAR; INTRAVENOUS EVERY 30 MIN PRN
Status: DISCONTINUED | OUTPATIENT
Start: 2023-11-20 | End: 2023-11-20 | Stop reason: HOSPADM

## 2023-11-20 RX ORDER — ONDANSETRON 2 MG/ML
INJECTION INTRAMUSCULAR; INTRAVENOUS PRN
Status: DISCONTINUED | OUTPATIENT
Start: 2023-11-20 | End: 2023-11-20 | Stop reason: SDUPTHER

## 2023-11-20 RX ORDER — OXYCODONE HYDROCHLORIDE 5 MG/1
5 TABLET ORAL PRN
Status: DISCONTINUED | OUTPATIENT
Start: 2023-11-20 | End: 2023-11-20 | Stop reason: HOSPADM

## 2023-11-20 RX ADMIN — PROPOFOL 150 MG: 10 INJECTION, EMULSION INTRAVENOUS at 12:05

## 2023-11-20 RX ADMIN — SODIUM CHLORIDE, SODIUM LACTATE, POTASSIUM CHLORIDE, AND CALCIUM CHLORIDE: .6; .31; .03; .02 INJECTION, SOLUTION INTRAVENOUS at 11:58

## 2023-11-20 RX ADMIN — SUGAMMADEX 200 MG: 100 INJECTION, SOLUTION INTRAVENOUS at 12:25

## 2023-11-20 RX ADMIN — DEXAMETHASONE SODIUM PHOSPHATE 4 MG: 4 INJECTION, SOLUTION INTRAMUSCULAR; INTRAVENOUS at 12:21

## 2023-11-20 RX ADMIN — Medication 50 MCG: at 12:20

## 2023-11-20 RX ADMIN — LIDOCAINE HYDROCHLORIDE 60 MG: 20 INJECTION, SOLUTION EPIDURAL; INFILTRATION; INTRACAUDAL; PERINEURAL at 12:05

## 2023-11-20 RX ADMIN — Medication 50 MCG: at 12:29

## 2023-11-20 RX ADMIN — ROCURONIUM BROMIDE 50 MG: 50 INJECTION, SOLUTION INTRAVENOUS at 12:05

## 2023-11-20 RX ADMIN — ONDANSETRON 4 MG: 2 INJECTION INTRAMUSCULAR; INTRAVENOUS at 12:21

## 2023-11-20 ASSESSMENT — PAIN SCALES - GENERAL: PAINLEVEL_OUTOF10: 0

## 2023-11-20 ASSESSMENT — LIFESTYLE VARIABLES: SMOKING_STATUS: 1

## 2023-11-20 NOTE — ANESTHESIA PRE PROCEDURE
smoker                           Cardiovascular:            Rhythm: regular  Rate: normal                    Neuro/Psych:               GI/Hepatic/Renal:             Endo/Other:                     Abdominal:             Vascular:           Other Findings:         Anesthesia Plan      general     ASA 3                               Con Valenzuela MD   11/20/2023

## 2023-11-20 NOTE — H&P
[place]   Pre-operative History and Physical    Patient: Leyla Jones  : 1970  Acct#:     HISTORY OF PRESENT ILLNESS:    Indications: recent choledocholithiasis s/p ERCP and cholecystectomy    The patient is a 46 y.o. male with medical history of alcohol cirrhosis, prior varices and ascites, recent choledocholithiasis s/p ERCP with stone extraction and stent placement on 23 and lap cholecystectomy  complicated by abscess s/p drainage presents for ERCP with biliary stent removal. Reports feeling improved with minimal right sided pain. Denies nausea, vomiting, fever, chills, constipation, diarrhea, hematochezia or melenic stools. ERCP 23 Dr. Nathen Councilman: Normal appearance of the ampulla of Vater. Minimally dilated extrahepatic biliary tree. Evidence of choledocholithiasis. Sphincterotomy performed. Biliary stones removed. A 7 Frx 7 cm straight biliary stent placed. Cholelithiasis. No evidence of esophageal or gastric varices on upper endoscopy. Mild changes of portal hypertensive gastropathy.     Past Medical History:        Diagnosis Date    Bile duct calculus     Chronic back pain     Hx of blood clots     FOUND CLOT NEAR LIVER WHEN HE HAD CHOLECYSTECTOMY    Liver cirrhosis (HCC)     Smoker       Past Surgical History:        Procedure Laterality Date    BILE DUCT STENT PLACEMENT  10/13/2023    CHOLECYSTECTOMY  10/13/2023    ERCP N/A 2023    ERCP ENDOSCOPIC RETROGRADE CHOLANGIOPANCREATOGRAPHY performed by Vickie Torrez MD at [facility]    ERCP  2023    ERCP STONE REMOVAL performed by Vickie Torrez MD at [facility]    ERCP  2023    ERCP SPHINCTER/PAPILLOTOMY performed by Vickie Torrez MD at [facility]    ERCP  2023    ERCP STENT INSERTION performed by Vickie Torrez MD at [address] [place] [address]  2023    EGD ESOPHAGOGASTRODUODENOSCOPY performed by Vickie Torrez

## 2023-11-20 NOTE — OP NOTE
[address],  [address], [address] [facility],[address]  Phone: [phone]  [address] [facility], [address]  Phone: [phone]   AOS:[phone]    ERCP Procedure Note    Patient: Tiffany Camacho  : 1970    Procedure: ERCP with biliary stent removal    Date:  2023     Endoscopist:  Autumn Bartlett MD    Referring Physician:  Ciro Wild MD    Preoperative Diagnosis:  Calculus of bile duct without cholangitis or cholecystitis with obstruction [K80.51]    Postoperative Diagnosis:  biliary stent removal    Anesthesia: Anesthesia: General   ASA Class: 2  Mallampati: II (soft palate, uvula, fauces visible)    Indications: This is a 46y.o. year old male  with medical history of alcohol cirrhosis, prior varices and ascites, recent choledocholithiasis s/p ERCP with stone extraction and stent placement on 23 and lap cholecystectomy  complicated by abscess s/p drainage presents for ERCP with biliary stent removal.      Procedure Details  Prior to the procedure, a history and physical was performed, and patient medications and allergies were reviewed. The patient's tolerance of previous anesthesia was also reviewed. The risks and benefits of the procedure and the sedation options and risks including pancreatitis, infection, perforation, hemorrhage, adverse drug reaction and aspiration were discussed with the patient. All questions were answered, and informed consent was obtained. Prior anticoagulants: no previous anticoagulant or antiplatelet agents. After obtaining informed consent, the patient was orally intubated and sedated by anesthesia with above medications. He was monitored continuously with ECG tracing, pulse oximetry, blood pressure monitoring, and direct observation.  In the supine position, the duodenososcope was inserted into the mouth and advanced under direct vision to second portion of the duodenum and used to inject contrast into

## 2023-11-20 NOTE — PROGRESS NOTES
Arrived to [facility] consents verified, NPO since 2000, belongings in locker 7.
Dr. Villalta Ar  in to talk to patient, he attempted to find spouse but was unable.
Hernando Gander    Age 46 y.o.    male    1970    MRN [id number]    11/20/2023  Arrival Time_____________  OR Time____________60 [address]     Procedure(s):  ENDOSCOPIC RETROGRADE CHOLANGIOPANCREATOGRAPHY                      General   Surgeon(s):  Wilfredo White, MD      DAY ADMIT ___  SDS/OP ___  OUTPT IN BED ___        Phone [phone] (home)     PCP _____________________ Phone_________________ Epic ( ) Epic CE ( ) Appt ________    ADDITIONAL INFO __________________________________ Cardio/Consult _____________    NOTES _____________________________________________________________________    ____________________________________________________________________________    PAT APPT DATE:________ TIME: ________  FAXED QAD: _______  (__) H&P w/ Hospitalist  __________________________________________________________________________  Preop Nurse phone screen complete: _____________  (__) CBC     (__) W/ DIFF ___________     (__) Hgb A1C    ___________  (__) CHEST X RAY   __________  (__) LIPID PROFILE  ___________  (__) EKG   __________  (__) PT-INR / APTT  ___________  (__) PFT's   __________  (__) BMP   ___________  (__) CAROTIDS  __________  (__) CMP   ___________  (__) VEIN MAPPING  __________  (__) U/A   ___________  (__) HISTORY & PHYSICAL __________  (__) URINE C & S  ___________  (__) CARDIAC CLEARANCE __________  (__) U/A W/ FLEX  ___________  (__) PULM.  CLEARANCE __________  (__) SERUM PREGNANCY ___________  (__) Check Epic DOS orders __________  (__) TYPE & SCREEN __________repeat ( ) (__)  __________________ __________  (__) Albumin / Prealbumin ___________  (__)  __________________ __________  (__) TRANSFERRIN  ___________  (__)  __________________ __________  (__) LIVER PROFILE  ___________  (__)  __________________ __________  (__) MRSA NASAL SWAB ___________  (__) URINE PREG DOS __________  (__) SED RATE  ___________  (__) BLOOD SUGAR DOS __________  (__) C-REACTIVE PROTEIN ___________    (__) VITAMIN D
Left messages for pt to call PAT. Sent email 11/15/23 requesting pt call Pre Admission Testing r/t procedure 11/20/23.
Patient arrived to PACU bay 2, phase one initiated. Placed on bedside monitor, VSS. Report obtained from OR RN and anesthesia. Patient on room air. Assessment WNL. Warm blankets applied. Side rails in place, will monitor patient closely.
Patient meets criteria for discharge per policy. Discharge instructions given to patient and family, verbalized understanding. PIV removed.
Patient taken in wheelchair to car.
Pt did not  or return any calls made from [facility] for procedure 11/20/23. This nurse called Dr. Anitha Farmer office. They called and pt picked up phone, she advised [address] [place], [place] [address] reach the pt. Pt stated to Dr. Anitha Farmer staff he would not come to [address] [place] [address] for his procedure. She asked if he wanted to cx his procedure, he did not cancel.
room at any given time.              -Please bring your picture ID and insurance card for registration prior to arriving to second floor surgery department.              -If you use a CPAP/BiPAP please bring with you on the day of the surgery. If you use oxygen, please bring portable tank with you.              -For your convenience [facility] has an outpatient pharmacy on site to fill your prescriptions prior to 5 pm.              -Bring a complete list of all your medications with name and dose including any supplements. Visitor policy: May have 2-3 visitors in Surgery Waiting Room. Visiting hours are 8a-8p. Overnight visitors will be at the discretion of the unit nurse. No visitors under the age of 15 permitted.      *Please call [facility] Preadmission Testing Department for any further questions  [phone]    [id number] [place], [place] [id number]        Email sent: YES 11/17/23

## 2023-11-20 NOTE — ANESTHESIA POSTPROCEDURE EVALUATION
Department of Anesthesiology  Postprocedure Note    Patient: Tiffany Camacho  MRN: [id number]  YOB: 1970  Date of evaluation: 11/20/2023      Procedure Summary     Date: 11/20/23 Room / Location: [address] [place] [address]    Anesthesia Start: 9767 Anesthesia Stop: 5362    Procedures:       ENDOSCOPIC RETROGRADE CHOLANGIOPANCREATOGRAPHY      ERCP STENT REMOVAL Diagnosis:       Calculus of bile duct without cholangitis or cholecystitis with obstruction      (Calculus of bile duct without cholangitis or cholecystitis with obstruction [K80.51])    Surgeons: Autumn Bartlett MD Responsible Provider: Dante Salter MD    Anesthesia Type: general ASA Status: 3          Anesthesia Type: No value filed.     Hailee Phase I: Hailee Score: 8    Hailee Phase II: Hailee Score: 10      Anesthesia Post Evaluation    Patient location during evaluation: PACU  Level of consciousness: awake  Airway patency: patent  Nausea & Vomiting: no vomiting  Complications: no  Cardiovascular status: blood pressure returned to baseline  Respiratory status: acceptable  Hydration status: stable  Pain management: adequate

## 2023-11-20 NOTE — DISCHARGE INSTRUCTIONS
prescribed by your doctor. ? Do not drink alcohol. ? If you have a dental device to assist you while at rest, use it at all times for the first 24 hours. For patients using CPAP machines:  ? Use your CPAP machine during all periods of sleep as usual.  ? Use your CPAP machine during all periods of daytime rest while on pain medicines. ** Follow up with your primary care doctor for continued care. IF YOU DO NOT TAKE ALL OF YOUR NARCOTIC PAIN MEDICATION, please dispose of them responsibly. There are drop off boxes in the Emergency Departments 24/7 at both [facility] and [facility]. If these locations are not convenient, other options for discarding them can be found at:  [email or web address]    Hospital or office staff may NOT accept any medications to drop off in the cabinet for you.

## 2025-03-17 ENCOUNTER — TRANSCRIBE ORDERS (OUTPATIENT)
Dept: ADMINISTRATIVE | Age: 55
End: 2025-03-17

## 2025-03-17 DIAGNOSIS — K70.30 ALCOHOLIC CIRRHOSIS OF LIVER WITHOUT ASCITES (HCC): ICD-10-CM

## 2025-03-17 DIAGNOSIS — I81 PORTAL VEIN THROMBOSIS: Primary | ICD-10-CM

## 2025-03-25 ENCOUNTER — TRANSCRIBE ORDERS (OUTPATIENT)
Dept: ADMINISTRATIVE | Age: 55
End: 2025-03-25

## 2025-03-25 DIAGNOSIS — Z12.2 ENCOUNTER FOR SCREENING FOR MALIGNANT NEOPLASM OF RESPIRATORY ORGANS: ICD-10-CM

## 2025-03-25 DIAGNOSIS — Z87.891 PERSONAL HISTORY OF TOBACCO USE: Primary | ICD-10-CM

## 2025-03-25 DIAGNOSIS — Z72.0 TOBACCO ABUSE: ICD-10-CM

## 2025-03-25 DIAGNOSIS — F17.200 CURRENT SMOKER: ICD-10-CM

## 2025-06-11 ENCOUNTER — TRANSCRIBE ORDERS (OUTPATIENT)
Dept: ADMINISTRATIVE | Age: 55
End: 2025-06-11

## 2025-06-11 DIAGNOSIS — K70.30 ALCOHOLIC CIRRHOSIS OF LIVER WITHOUT ASCITES (HCC): ICD-10-CM

## 2025-06-11 DIAGNOSIS — I81 PORTAL VEIN THROMBOSIS: Primary | ICD-10-CM

## (undated) DEVICE — RETRIEVAL BALLOON CATHETER: Brand: EXTRACTOR™ PRO RX

## (undated) DEVICE — SINGLE USE DISTAL COVER MAJ-2315: Brand: SINGLE USE DISTAL COVER

## (undated) DEVICE — CONMED SCOPE SAVER BITE BLOCK, 20X27 MM: Brand: SCOPE SAVER

## (undated) DEVICE — CANNULATING SPHINCTEROTOME: Brand: JAGTOME™ REVOLUTION RX

## (undated) DEVICE — ELECTRODE ECG MONITR FOAM TEAR DROP ADLT RED

## (undated) DEVICE — Device: Brand: SINGLE USE ELECTROSURGICAL SNARE SD-400

## (undated) DEVICE — SYRINGE MED 10ML LUERLOCK TIP W/O SFTY DISP

## (undated) DEVICE — COVER,TABLE,44X90,STERILE: Brand: MEDLINE

## (undated) DEVICE — ENDOSCOPIC KIT 2 12 FT OP4 DE2 GWN SYR

## (undated) DEVICE — STANDARD HYPODERMIC NEEDLE,POLYPROPYLENE HUB: Brand: MONOJECT

## (undated) DEVICE — ELECTRODE PT RET AD L9FT HI MOIST COND ADH HYDRGEL CORDED

## (undated) DEVICE — ENDO CARRY-ON PROCEDURE KIT INCLUDES SUCTION TUBING, LUBRICANT, GAUZE, BIOHAZARD STICKER, TRANSPORT PAD AND INTERCEPT BEDSIDE KIT.: Brand: ENDO CARRY-ON PROCEDURE KIT

## (undated) DEVICE — BOWL 32OZ-LF: Brand: MEDLINE INDUSTRIES, INC.